# Patient Record
Sex: FEMALE | Race: OTHER | NOT HISPANIC OR LATINO | URBAN - METROPOLITAN AREA
[De-identification: names, ages, dates, MRNs, and addresses within clinical notes are randomized per-mention and may not be internally consistent; named-entity substitution may affect disease eponyms.]

---

## 2023-01-17 ENCOUNTER — EMERGENCY (EMERGENCY)
Facility: HOSPITAL | Age: 38
LOS: 1 days | Discharge: HOME | End: 2023-01-17
Attending: STUDENT IN AN ORGANIZED HEALTH CARE EDUCATION/TRAINING PROGRAM
Payer: COMMERCIAL

## 2023-01-17 VITALS
RESPIRATION RATE: 18 BRPM | WEIGHT: 171.96 LBS | SYSTOLIC BLOOD PRESSURE: 126 MMHG | HEART RATE: 96 BPM | HEIGHT: 69 IN | OXYGEN SATURATION: 100 % | DIASTOLIC BLOOD PRESSURE: 62 MMHG | TEMPERATURE: 99 F

## 2023-01-17 VITALS
TEMPERATURE: 98 F | RESPIRATION RATE: 18 BRPM | HEART RATE: 82 BPM | DIASTOLIC BLOOD PRESSURE: 69 MMHG | SYSTOLIC BLOOD PRESSURE: 110 MMHG | OXYGEN SATURATION: 97 %

## 2023-01-17 PROCEDURE — 99282 EMERGENCY DEPT VISIT SF MDM: CPT

## 2023-01-17 PROCEDURE — 99284 EMERGENCY DEPT VISIT MOD MDM: CPT

## 2023-01-17 RX ORDER — ACETAMINOPHEN 500 MG
975 TABLET ORAL ONCE
Refills: 0 | Status: COMPLETED | OUTPATIENT
Start: 2023-01-17 | End: 2023-01-17

## 2023-01-17 RX ORDER — LIDOCAINE 4 G/100G
1 CREAM TOPICAL ONCE
Refills: 0 | Status: COMPLETED | OUTPATIENT
Start: 2023-01-17 | End: 2023-01-17

## 2023-01-17 RX ADMIN — LIDOCAINE 1 PATCH: 4 CREAM TOPICAL at 12:02

## 2023-01-17 RX ADMIN — Medication 975 MILLIGRAM(S): at 12:02

## 2023-01-17 RX ADMIN — Medication 975 MILLIGRAM(S): at 12:08

## 2023-01-17 NOTE — ED PROVIDER NOTE - PHYSICAL EXAMINATION
CONSTITUTIONAL: non-toxic, well appearing  SKIN: no rash, no petechiae.  EYES: pink conjunctiva, anicteric  NECK: Supple; no meningismus, no JVD  CARD: RRR, no murmurs, equal radial pulses bilaterally 2+  RESP: CTAB, no respiratory distress  ABD: Soft, non-tender, non-distended, no peritoneal signs, no CVA tenderness  SPINE: no midline bony tenderness, right lumbar paravertebral muscle tenderness  EXT: Normal ROM x4. No edema.   NEURO: Alert, oriented. Neuro exam nonfocal, equal strength bilaterally  PSYCH: Cooperative, appropriate.

## 2023-01-17 NOTE — ED ADULT NURSE NOTE - OBJECTIVE STATEMENT
Pt presents to the ED with c/o right lower back pain x 2 days after mopping. Pt is 11 weeks pregnant. Denies fever, abdominal pain, or vaginal bleeding.

## 2023-01-17 NOTE — ED PROVIDER NOTE - PATIENT PORTAL LINK FT
You can access the FollowMyHealth Patient Portal offered by [facility] by registering at the following website: [email or web address]. By joining [employer]’s FollowMyHealth portal, you will also be able to view your health information using other applications (apps) compatible with our system.

## 2023-01-17 NOTE — ED PROVIDER NOTE - CLINICAL SUMMARY MEDICAL DECISION MAKING FREE TEXT BOX
Andrews: 37-year-old female approximately 11 weeks pregnant presents with right lower back pain x2 days.  Patient states she was mopping 2 days ago, bent forward, and felt immediate nonradiating pain in her right lower back.  Denies any fevers, chest pain, shortness of breath, dysuria, urinary urgency, urinary frequency, vomiting, abdominal pain, diarrhea, bloody stools, black tarry stools, vaginal bleeding, loss of fluid per vagina, bowel/bladder dysfunction, saddle anesthesia, numbness, weakness, or rash.  Patient was taking Tylenol with little improvement.  Denies any direct injury or trauma.  Patient states she has follow-up with her OB/GYN and had normal ultrasounds confirming intrauterine pregnancy.  Patient states pregnancy has been uncomplicated to date.  Symptoms MSK in nature, likely muscle spasm. No risk factors or findings concerning for epidural abscess, diskitis, vertebral osteo, cord compression, cauda equina, vertebral fracture or yuli malignancy, AAA, or pyelonephritis.  Patient instructed to consider further imaging and workup through their primary care physician as an outpatient, if symptoms persist. Will provide supportive treatment, DC with OBGYN follow up with return precautions.

## 2023-01-17 NOTE — ED PROVIDER NOTE - OBJECTIVE STATEMENT
37-year-old female approximately 11 weeks pregnant presents with right lower back pain x2 days.  Patient states she was mopping 2 days ago, bent forward, and felt immediate nonradiating pain in her right lower back.  Denies any fevers, chest pain, shortness of breath, dysuria, urinary urgency, urinary frequency, vomiting, abdominal pain, diarrhea, bloody stools, black tarry stools, vaginal bleeding, loss of fluid per vagina, bowel/bladder dysfunction, saddle anesthesia, numbness, weakness, or rash.  Patient was taking Tylenol with little improvement.  Denies any direct injury or trauma.  Patient states she has follow-up with her OB/GYN and had normal ultrasounds confirming intrauterine pregnancy.  Patient states pregnancy has been uncomplicated to date.  Denies any additional complaints.

## 2023-01-20 ENCOUNTER — APPOINTMENT (OUTPATIENT)
Dept: ANTEPARTUM | Facility: CLINIC | Age: 38
End: 2023-01-20
Payer: COMMERCIAL

## 2023-01-20 ENCOUNTER — ASOB RESULT (OUTPATIENT)
Age: 38
End: 2023-01-20

## 2023-01-20 PROCEDURE — 76813 OB US NUCHAL MEAS 1 GEST: CPT

## 2023-01-20 PROCEDURE — 76801 OB US < 14 WKS SINGLE FETUS: CPT

## 2023-01-20 PROCEDURE — 36416 COLLJ CAPILLARY BLOOD SPEC: CPT

## 2023-01-24 PROBLEM — Z00.00 ENCOUNTER FOR PREVENTIVE HEALTH EXAMINATION: Noted: 2023-01-24

## 2023-01-25 ENCOUNTER — ASOB RESULT (OUTPATIENT)
Age: 38
End: 2023-01-25

## 2023-01-25 ENCOUNTER — APPOINTMENT (OUTPATIENT)
Dept: MATERNAL FETAL MEDICINE | Facility: CLINIC | Age: 38
End: 2023-01-25
Payer: COMMERCIAL

## 2023-01-25 ENCOUNTER — APPOINTMENT (OUTPATIENT)
Dept: ANTEPARTUM | Facility: CLINIC | Age: 38
End: 2023-01-25
Payer: COMMERCIAL

## 2023-01-25 PROBLEM — Z00.00 ENCOUNTER FOR PREVENTIVE HEALTH EXAMINATION: Status: ACTIVE | Noted: 2023-01-25

## 2023-01-25 PROCEDURE — 99204 OFFICE O/P NEW MOD 45 MIN: CPT | Mod: 25

## 2023-01-25 PROCEDURE — 76801 OB US < 14 WKS SINGLE FETUS: CPT

## 2023-01-25 PROCEDURE — 76813 OB US NUCHAL MEAS 1 GEST: CPT | Mod: 59

## 2023-01-25 PROCEDURE — 36415 COLL VENOUS BLD VENIPUNCTURE: CPT

## 2023-02-01 ENCOUNTER — APPOINTMENT (OUTPATIENT)
Dept: MATERNAL FETAL MEDICINE | Facility: CLINIC | Age: 38
End: 2023-02-01
Payer: COMMERCIAL

## 2023-02-01 ENCOUNTER — APPOINTMENT (OUTPATIENT)
Dept: MATERNAL FETAL MEDICINE | Facility: CLINIC | Age: 38
End: 2023-02-01

## 2023-02-01 PROCEDURE — 99214 OFFICE O/P EST MOD 30 MIN: CPT

## 2023-02-27 ENCOUNTER — NON-APPOINTMENT (OUTPATIENT)
Age: 38
End: 2023-02-27

## 2023-03-17 ENCOUNTER — APPOINTMENT (OUTPATIENT)
Dept: ANTEPARTUM | Facility: CLINIC | Age: 38
End: 2023-03-17

## 2023-03-20 ENCOUNTER — ASOB RESULT (OUTPATIENT)
Age: 38
End: 2023-03-20

## 2023-03-20 ENCOUNTER — NON-APPOINTMENT (OUTPATIENT)
Age: 38
End: 2023-03-20

## 2023-03-20 ENCOUNTER — APPOINTMENT (OUTPATIENT)
Dept: ANTEPARTUM | Facility: CLINIC | Age: 38
End: 2023-03-20
Payer: COMMERCIAL

## 2023-03-20 PROCEDURE — 76811 OB US DETAILED SNGL FETUS: CPT

## 2023-03-28 ENCOUNTER — ASOB RESULT (OUTPATIENT)
Age: 38
End: 2023-03-28

## 2023-03-28 ENCOUNTER — APPOINTMENT (OUTPATIENT)
Dept: MATERNAL FETAL MEDICINE | Facility: CLINIC | Age: 38
End: 2023-03-28
Payer: COMMERCIAL

## 2023-03-28 ENCOUNTER — NON-APPOINTMENT (OUTPATIENT)
Age: 38
End: 2023-03-28

## 2023-03-28 PROCEDURE — 99204 OFFICE O/P NEW MOD 45 MIN: CPT | Mod: 95

## 2023-03-29 ENCOUNTER — NON-APPOINTMENT (OUTPATIENT)
Age: 38
End: 2023-03-29

## 2023-04-03 ENCOUNTER — APPOINTMENT (OUTPATIENT)
Dept: PEDIATRIC CARDIOLOGY | Facility: CLINIC | Age: 38
End: 2023-04-03
Payer: COMMERCIAL

## 2023-04-03 PROCEDURE — 76825 ECHO EXAM OF FETAL HEART: CPT

## 2023-04-03 PROCEDURE — 93325 DOPPLER ECHO COLOR FLOW MAPG: CPT

## 2023-04-03 PROCEDURE — 76827 ECHO EXAM OF FETAL HEART: CPT

## 2023-04-03 PROCEDURE — 99203 OFFICE O/P NEW LOW 30 MIN: CPT | Mod: 25

## 2023-04-10 ENCOUNTER — APPOINTMENT (OUTPATIENT)
Dept: ANTEPARTUM | Facility: CLINIC | Age: 38
End: 2023-04-10
Payer: COMMERCIAL

## 2023-04-10 ENCOUNTER — ASOB RESULT (OUTPATIENT)
Age: 38
End: 2023-04-10

## 2023-04-10 PROCEDURE — 76828 ECHO EXAM OF FETAL HEART: CPT

## 2023-04-13 ENCOUNTER — APPOINTMENT (OUTPATIENT)
Dept: RHEUMATOLOGY | Facility: CLINIC | Age: 38
End: 2023-04-13
Payer: COMMERCIAL

## 2023-04-13 VITALS
WEIGHT: 180 LBS | RESPIRATION RATE: 16 BRPM | SYSTOLIC BLOOD PRESSURE: 118 MMHG | BODY MASS INDEX: 26.66 KG/M2 | HEART RATE: 86 BPM | OXYGEN SATURATION: 97 % | DIASTOLIC BLOOD PRESSURE: 72 MMHG | HEIGHT: 69 IN | TEMPERATURE: 97.6 F

## 2023-04-13 DIAGNOSIS — K11.7 DISTURBANCES OF SALIVARY SECRETION: ICD-10-CM

## 2023-04-13 DIAGNOSIS — H04.129 DRY EYE SYNDROME OF UNSPECIFIED LACRIMAL GLAND: ICD-10-CM

## 2023-04-13 LAB
APTT BLD: 29.5 SEC
BASOPHILS # BLD AUTO: 0.02 K/UL
BASOPHILS NFR BLD AUTO: 0.2 %
EOSINOPHIL # BLD AUTO: 0.09 K/UL
EOSINOPHIL NFR BLD AUTO: 0.9 %
HCT VFR BLD CALC: 35.5 %
HGB BLD-MCNC: 10.9 G/DL
IMM GRANULOCYTES NFR BLD AUTO: 0.5 %
INR PPP: 0.99 RATIO
LYMPHOCYTES # BLD AUTO: 1.66 K/UL
LYMPHOCYTES NFR BLD AUTO: 17 %
MAN DIFF?: NORMAL
MCHC RBC-ENTMCNC: 26.3 PG
MCHC RBC-ENTMCNC: 30.7 GM/DL
MCV RBC AUTO: 85.5 FL
MONOCYTES # BLD AUTO: 0.53 K/UL
MONOCYTES NFR BLD AUTO: 5.4 %
NEUTROPHILS # BLD AUTO: 7.42 K/UL
NEUTROPHILS NFR BLD AUTO: 76 %
PLATELET # BLD AUTO: 241 K/UL
PT BLD: 11.6 SEC
RBC # BLD: 4.15 M/UL
RBC # FLD: 14.6 %
WBC # FLD AUTO: 9.77 K/UL

## 2023-04-13 PROCEDURE — 99205 OFFICE O/P NEW HI 60 MIN: CPT

## 2023-04-13 RX ORDER — HYDROXYCHLOROQUINE SULFATE 200 MG/1
200 TABLET, FILM COATED ORAL DAILY
Qty: 30 | Refills: 5 | Status: ACTIVE | COMMUNITY
Start: 2023-04-13 | End: 1900-01-01

## 2023-04-13 NOTE — HISTORY OF PRESENT ILLNESS
[FreeTextEntry1] : 1. RA: Missing information but according to patient, she was diagnosed with RA in 2020 with symptoms of swelling and stiffness of her legs and hand joints with positive blood tests. Followed with Dr. Alexus Martines (Rheumatology [facility]) who recommended treatment at that time, but she did not remember the name of the medication and was only taking it for a brief period of time. Symptoms typically worsened in the winter. A CCP Ab from 2022 was negative. She has been off medications since 2022. At the time of her initial visit while pregnant, she had no joint symptoms.\par 2. Positive SSA: Anti-SSA ab positive with titer ~1000, checked in the context of RA diagnosis and 2023 pregnancy. Reported sicca symptoms but no other complaints. She followed with MFM and has had fetal echo's, which were normal. She is currently scheduled to repeat fetal echo's every 2 weeks. MADI, complements were previously negative at an outside lab. Patient also had low titer B2GPI and aCL <40 without previous obstetric complications, DVT/PE history.\par 3. OB history: \par   a. Pregnancy No. 1: 2008, which was a normal, full term pregnancy. \par   b. Pregnancy No. 2: 2023: currently 23 weeks pregnant and no complications. She was started on ASA 81 mg       2xd for aPL low titer blood tests. Fetal echo's every 2 weeks due to SSA+ blood test. \par \par Meds\par ASA 81 mg/d\par vitamins\par

## 2023-04-13 NOTE — ASSESSMENT
[FreeTextEntry1] : 1. RA: Missing information but according to patient, she was diagnosed with RA in 2020 with symptoms of swelling and stiffness of her legs and hand joints with positive blood tests. Followed with Dr. Alexus Martines (Rheumatology [facility]) who recommended treatment at that time, but she did not remember the name of the medication and was only taking it for a brief period of time. Symptoms typically worsened in the winter. A CCP Ab from 2022 was negative. She has been off medications since 2022. At the time of her initial visit while pregnant, she had no joint symptoms.\par 2. Positive SSA: Anti-SSA ab positive with titer ~1000, checked in the context of RA diagnosis and 2023 pregnancy. Reported sicca symptoms but no other complaints. She followed with MFM and has had fetal echo's, which were normal. She is currently scheduled to repeat fetal echo's every 2 weeks. MADI, complements were previously negative at an outside lab. Patient also had low titer B2GPI and aCL <40 without previous obstetric complications, DVT/PE history.\par 3. OB history: \par   a. Pregnancy No. 1: 2008, which was a normal, full term pregnancy. \par   b. Pregnancy No. 2: 2023: currently 23 weeks pregnant and no complications. She was started on ASA 81 mg       2xd for aPL low titer blood tests. Fetal echo's every 2 weeks due to SSA+ blood test. \par \par Plan\par Lab tests\par Reviewed internal and/or external records of other providers\par Contact me\par Start hydroxychloroquine 400 mg/day (toxicities discussed)\par Fetal echo's to continue\par ASA 81 mg to continue\par Return one month\par \par

## 2023-04-13 NOTE — REASON FOR VISIT
[Consultation] : a consultation visit [FreeTextEntry1] : Jenniffer Love MD [id number] [phone]

## 2023-04-13 NOTE — PHYSICAL EXAM
[General Appearance - Alert] : alert [General Appearance - In No Acute Distress] : in no acute distress [General Appearance - Well Nourished] : well nourished [General Appearance - Well Developed] : well developed [Sclera] : the sclera and conjunctiva were normal [PERRL With Normal Accommodation] : pupils were equal in size, round, and reactive to light [Neck Appearance] : the appearance of the neck was normal [Respiration, Rhythm And Depth] : normal respiratory rhythm and effort [Auscultation Breath Sounds / Voice Sounds] : lungs were clear to auscultation bilaterally [Heart Rate And Rhythm] : heart rate was normal and rhythm regular [Heart Sounds] : normal S1 and S2 [Edema] : there was no peripheral edema [Abdomen Soft] : soft [Abdomen Tenderness] : non-tender [Cervical Lymph Nodes Enlarged Posterior Bilaterally] : posterior cervical [Cervical Lymph Nodes Enlarged Anterior Bilaterally] : anterior cervical [Supraclavicular Lymph Nodes Enlarged Bilaterally] : supraclavicular [Abnormal Walk] : normal gait [Nail Clubbing] : no clubbing  or cyanosis of the fingernails [Musculoskeletal - Swelling] : no joint swelling seen [] : no rash [Skin Lesions] : no skin lesions [No Focal Deficits] : no focal deficits

## 2023-04-15 LAB
ALBUMIN SERPL ELPH-MCNC: 3.7 G/DL
ALP BLD-CCNC: 64 U/L
ALT SERPL-CCNC: 8 U/L
ANION GAP SERPL CALC-SCNC: 13 MMOL/L
APPEARANCE: ABNORMAL
AST SERPL-CCNC: 11 U/L
B2 GLYCOPROT1 IGA SERPL IA-ACNC: <5 SAU
B2 GLYCOPROT1 IGG SER-ACNC: <5 SGU
B2 GLYCOPROT1 IGM SER-ACNC: 8.5 SMU
BACTERIA: NEGATIVE
BILIRUB SERPL-MCNC: 0.2 MG/DL
BILIRUBIN URINE: NEGATIVE
BLOOD URINE: NEGATIVE
BUN SERPL-MCNC: 6 MG/DL
C3 SERPL-MCNC: 129 MG/DL
C4 SERPL-MCNC: 42 MG/DL
CALCIUM SERPL-MCNC: 8.7 MG/DL
CARDIOLIPIN IGM SER-MCNC: 16.3 MPL
CARDIOLIPIN IGM SER-MCNC: <5 GPL
CCP AB SER IA-ACNC: <8 UNITS
CHLORIDE SERPL-SCNC: 103 MMOL/L
CK SERPL-CCNC: 42 U/L
CO2 SERPL-SCNC: 22 MMOL/L
COLOR: YELLOW
CONFIRM: 27.5 SEC
CREAT SERPL-MCNC: 0.55 MG/DL
CREAT SPEC-SCNC: 146 MG/DL
CREAT/PROT UR: 0.1 RATIO
CRP SERPL-MCNC: 5 MG/L
DEPRECATED CARDIOLIPIN IGA SER: <5 APL
DIRECT COOMBS: NORMAL
DRVVT IMM 1:2 NP PPP: NORMAL
DRVVT SCREEN TO CONFIRM RATIO: 1.11 RATIO
DSDNA AB SER-ACNC: <12 IU/ML
EGFR: 120 ML/MIN/1.73M2
GLUCOSE QUALITATIVE U: NEGATIVE
HYALINE CASTS: 0 /LPF
KETONES URINE: NEGATIVE
LEUKOCYTE ESTERASE URINE: NEGATIVE
MICROSCOPIC-UA: NORMAL
NITRITE URINE: NEGATIVE
PH URINE: 7
POTASSIUM SERPL-SCNC: 3.8 MMOL/L
PROT SERPL-MCNC: 6.9 G/DL
PROT UR-MCNC: 13 MG/DL
PROTEIN URINE: NORMAL
RED BLOOD CELLS URINE: 0 /HPF
RF+CCP IGG SER-IMP: NEGATIVE
RHEUMATOID FACT SER QL: 15 IU/ML
SCREEN DRVVT: 36.3 SEC
SILICA CLOTTING TIME INTERPRETATION: NORMAL
SILICA CLOTTING TIME S/C: 0.84 RATIO
SODIUM SERPL-SCNC: 138 MMOL/L
SPECIFIC GRAVITY URINE: 1.02
SQUAMOUS EPITHELIAL CELLS: >27 /HPF
THYROGLOB AB SERPL-ACNC: <20 IU/ML
THYROPEROXIDASE AB SERPL IA-ACNC: <10 IU/ML
URINE COMMENTS: NORMAL
UROBILINOGEN URINE: NORMAL
WHITE BLOOD CELLS URINE: 4 /HPF

## 2023-04-16 LAB
CHROMATIN AB SERPL-ACNC: <0.2 AL
ENA RNP AB SER IA-ACNC: 0.6 AL
ENA SM AB SER IA-ACNC: <0.2 AL
ENA SS-A AB SER IA-ACNC: >8 AL
ENA SS-B AB SER IA-ACNC: <0.2 AL
PS IGA SER QL: <1
PS IGG SER QL: <9 UNITS
PS IGM SER QL: 14 UNITS

## 2023-04-17 ENCOUNTER — APPOINTMENT (OUTPATIENT)
Dept: PEDIATRIC CARDIOLOGY | Facility: CLINIC | Age: 38
End: 2023-04-17
Payer: COMMERCIAL

## 2023-04-17 PROCEDURE — 93325 DOPPLER ECHO COLOR FLOW MAPG: CPT | Mod: 59

## 2023-04-17 PROCEDURE — 76826 ECHO EXAM OF FETAL HEART: CPT

## 2023-04-17 PROCEDURE — 76820 UMBILICAL ARTERY ECHO: CPT

## 2023-04-17 PROCEDURE — 76821 MIDDLE CEREBRAL ARTERY ECHO: CPT

## 2023-04-17 PROCEDURE — 99214 OFFICE O/P EST MOD 30 MIN: CPT | Mod: 25

## 2023-04-17 PROCEDURE — 76828 ECHO EXAM OF FETAL HEART: CPT

## 2023-04-18 LAB
ANA PAT FLD IF-IMP: ABNORMAL
ANA SER IF-ACNC: ABNORMAL

## 2023-04-21 LAB
PS-PROTHROM CMPLX IGG SERPL IA-ACNC: <9.4 U
PS-PROTHROM CMPLX IGM SERPL IA-ACNC: 16.3 U

## 2023-04-24 ENCOUNTER — ASOB RESULT (OUTPATIENT)
Age: 38
End: 2023-04-24

## 2023-04-24 ENCOUNTER — APPOINTMENT (OUTPATIENT)
Dept: ANTEPARTUM | Facility: CLINIC | Age: 38
End: 2023-04-24
Payer: COMMERCIAL

## 2023-04-24 ENCOUNTER — TRANSCRIPTION ENCOUNTER (OUTPATIENT)
Age: 38
End: 2023-04-24

## 2023-04-24 PROCEDURE — 76828 ECHO EXAM OF FETAL HEART: CPT | Mod: 59

## 2023-04-24 PROCEDURE — 76816 OB US FOLLOW-UP PER FETUS: CPT

## 2023-05-01 ENCOUNTER — APPOINTMENT (OUTPATIENT)
Dept: PEDIATRIC CARDIOLOGY | Facility: CLINIC | Age: 38
End: 2023-05-01
Payer: COMMERCIAL

## 2023-05-01 PROCEDURE — 76826 ECHO EXAM OF FETAL HEART: CPT

## 2023-05-01 PROCEDURE — 99212 OFFICE O/P EST SF 10 MIN: CPT | Mod: 25

## 2023-05-01 PROCEDURE — 76828 ECHO EXAM OF FETAL HEART: CPT

## 2023-05-01 PROCEDURE — 93325 DOPPLER ECHO COLOR FLOW MAPG: CPT

## 2023-05-08 ENCOUNTER — ASOB RESULT (OUTPATIENT)
Age: 38
End: 2023-05-08

## 2023-05-08 ENCOUNTER — APPOINTMENT (OUTPATIENT)
Dept: ANTEPARTUM | Facility: CLINIC | Age: 38
End: 2023-05-08
Payer: COMMERCIAL

## 2023-05-08 PROCEDURE — 76828 ECHO EXAM OF FETAL HEART: CPT

## 2023-05-09 ENCOUNTER — APPOINTMENT (OUTPATIENT)
Dept: RHEUMATOLOGY | Facility: CLINIC | Age: 38
End: 2023-05-09
Payer: COMMERCIAL

## 2023-05-09 VITALS
WEIGHT: 181 LBS | OXYGEN SATURATION: 98 % | DIASTOLIC BLOOD PRESSURE: 71 MMHG | BODY MASS INDEX: 26.81 KG/M2 | HEIGHT: 69 IN | RESPIRATION RATE: 16 BRPM | SYSTOLIC BLOOD PRESSURE: 120 MMHG | TEMPERATURE: 97.1 F | HEART RATE: 92 BPM

## 2023-05-09 PROCEDURE — 99213 OFFICE O/P EST LOW 20 MIN: CPT

## 2023-05-09 NOTE — HISTORY OF PRESENT ILLNESS
[FreeTextEntry1] : 1. RA: Missing information but according to patient, she was diagnosed with RA in 2020 with symptoms of swelling and stiffness of her legs and hand joints with positive blood tests. Followed with Dr. Alexus Martines (Rheumatology [facility]) who recommended treatment at that time, but she did not remember the name of the medication and was only taking it for a brief period of time. Symptoms typically worsened in the winter. A CCP Ab from 2022 was negative. She has been off medications since 2022. At the time of her initial visit while pregnant, she had no joint symptoms.\par 2. Positive SSA: Anti-SSA ab positive with titer ~1000, checked in the context of RA diagnosis and 2023 pregnancy. Reported sicca symptoms but no other complaints. She followed with MFM and has had fetal echo's, which were normal. She is currently scheduled to repeat fetal echo's every 2 weeks. MADI, complements were previously negative at an outside lab. Patient also had low titer B2GPI and aCL <40 without previous obstetric complications, DVT/PE history.\par 3. OB history: \par   a. Pregnancy No. 1: 2008, which was a normal, full term pregnancy. \par   b. Pregnancy No. 2: 2023: currently 26 weeks pregnant and no complications. She was started on ASA 81 mg       2xd for aPL low titer blood tests. Fetal echo's every 2 weeks due to SSA+ blood test. She started hydroxychloroquine at 22 weeks gestation. \par \par Meds\par ASA 81 mg/d\par vitamins\par hydroxychloroquine  200 mg 2xd

## 2023-05-09 NOTE — REASON FOR VISIT
[Consultation] : a consultation visit [FreeTextEntry1] : Jenniffer Love MD [id number] [phone] [Follow-Up: _____] : a [unfilled] follow-up visit [Spouse] : spouse [Family Member] : family member

## 2023-05-09 NOTE — PHYSICAL EXAM
[General Appearance - Alert] : alert [General Appearance - In No Acute Distress] : in no acute distress [General Appearance - Well Nourished] : well nourished [General Appearance - Well Developed] : well developed [Sclera] : the sclera and conjunctiva were normal [PERRL With Normal Accommodation] : pupils were equal in size, round, and reactive to light [Neck Appearance] : the appearance of the neck was normal [Respiration, Rhythm And Depth] : normal respiratory rhythm and effort [Auscultation Breath Sounds / Voice Sounds] : lungs were clear to auscultation bilaterally [Heart Rate And Rhythm] : heart rate was normal and rhythm regular [Heart Sounds] : normal S1 and S2 [Edema] : there was no peripheral edema [Abdomen Soft] : soft [Abdomen Tenderness] : non-tender [Cervical Lymph Nodes Enlarged Posterior Bilaterally] : posterior cervical [Cervical Lymph Nodes Enlarged Anterior Bilaterally] : anterior cervical [Supraclavicular Lymph Nodes Enlarged Bilaterally] : supraclavicular [Abnormal Walk] : normal gait [Nail Clubbing] : no clubbing  or cyanosis of the fingernails [Musculoskeletal - Swelling] : no joint swelling seen [] : no rash [Skin Lesions] : no skin lesions [No Focal Deficits] : no focal deficits [FreeTextEntry1] : gravid

## 2023-05-09 NOTE — ASSESSMENT
[FreeTextEntry1] : 1. RA: Missing information but according to patient, she was diagnosed with RA in 2020 with symptoms of swelling and stiffness of her legs and hand joints with positive blood tests. Followed with Dr. Alexus Martines (Rheumatology [facility]) who recommended treatment at that time, but she did not remember the name of the medication and was only taking it for a brief period of time. Symptoms typically worsened in the winter. A CCP Ab from 2022 was negative. She has been off medications since 2022. At the time of her initial visit while pregnant, she had no joint symptoms.\par 2. Positive SSA: Anti-SSA ab positive with titer ~1000, checked in the context of RA diagnosis and 2023 pregnancy. Reported sicca symptoms but no other complaints. She followed with MFM and has had fetal echo's, which were normal. She is currently scheduled to repeat fetal echo's every 2 weeks. MADI, complements were previously negative at an outside lab. Patient also had low titer B2GPI and aCL <40 without previous obstetric complications, DVT/PE history.\par 3. OB history: \par   a. Pregnancy No. 1: 2008, which was a normal, full term pregnancy. \par   b. Pregnancy No. 2: 2023: currently 26 weeks pregnant and no complications. She was started on ASA 81 mg       2xd for aPL low titer blood tests. Fetal echo's every 2 weeks due to SSA+ blood test. She started hydroxychloroquine at 22 weeks gestation. \par \par Plan\par Lab tests reviewed\par No new labs\par Return 1 month\par Start hydroxychloroquine 400 mg/day (toxicities discussed)\par Fetal echo's to continue\par ASA 81 mg to continue\par Return one month\par \par

## 2023-05-15 ENCOUNTER — APPOINTMENT (OUTPATIENT)
Dept: PEDIATRIC CARDIOLOGY | Facility: CLINIC | Age: 38
End: 2023-05-15
Payer: COMMERCIAL

## 2023-05-15 PROCEDURE — 99212 OFFICE O/P EST SF 10 MIN: CPT | Mod: 25

## 2023-05-15 PROCEDURE — 93325 DOPPLER ECHO COLOR FLOW MAPG: CPT

## 2023-05-15 PROCEDURE — 76826 ECHO EXAM OF FETAL HEART: CPT

## 2023-05-15 PROCEDURE — 76828 ECHO EXAM OF FETAL HEART: CPT

## 2023-05-22 ENCOUNTER — APPOINTMENT (OUTPATIENT)
Dept: ANTEPARTUM | Facility: CLINIC | Age: 38
End: 2023-05-22
Payer: COMMERCIAL

## 2023-05-22 ENCOUNTER — ASOB RESULT (OUTPATIENT)
Age: 38
End: 2023-05-22

## 2023-05-22 PROCEDURE — 76819 FETAL BIOPHYS PROFIL W/O NST: CPT | Mod: 59

## 2023-05-22 PROCEDURE — 76816 OB US FOLLOW-UP PER FETUS: CPT

## 2023-06-02 ENCOUNTER — ASOB RESULT (OUTPATIENT)
Age: 38
End: 2023-06-02

## 2023-06-02 ENCOUNTER — APPOINTMENT (OUTPATIENT)
Dept: MATERNAL FETAL MEDICINE | Facility: CLINIC | Age: 38
End: 2023-06-02
Payer: COMMERCIAL

## 2023-06-02 PROCEDURE — 99213 OFFICE O/P EST LOW 20 MIN: CPT | Mod: 95

## 2023-06-19 ENCOUNTER — APPOINTMENT (OUTPATIENT)
Dept: ANTEPARTUM | Facility: CLINIC | Age: 38
End: 2023-06-19
Payer: COMMERCIAL

## 2023-06-19 ENCOUNTER — ASOB RESULT (OUTPATIENT)
Age: 38
End: 2023-06-19

## 2023-06-19 PROCEDURE — 76816 OB US FOLLOW-UP PER FETUS: CPT

## 2023-06-19 PROCEDURE — 76819 FETAL BIOPHYS PROFIL W/O NST: CPT

## 2023-06-26 ENCOUNTER — APPOINTMENT (OUTPATIENT)
Dept: RHEUMATOLOGY | Facility: CLINIC | Age: 38
End: 2023-06-26
Payer: COMMERCIAL

## 2023-06-26 VITALS
DIASTOLIC BLOOD PRESSURE: 71 MMHG | OXYGEN SATURATION: 98 % | RESPIRATION RATE: 16 BRPM | WEIGHT: 182 LBS | HEART RATE: 96 BPM | SYSTOLIC BLOOD PRESSURE: 117 MMHG | HEIGHT: 69 IN | TEMPERATURE: 98.1 F | BODY MASS INDEX: 26.96 KG/M2

## 2023-06-26 DIAGNOSIS — O09.90 SUPERVISION OF HIGH RISK PREGNANCY, UNSPECIFIED, UNSPECIFIED TRIMESTER: ICD-10-CM

## 2023-06-26 PROCEDURE — 99214 OFFICE O/P EST MOD 30 MIN: CPT

## 2023-06-27 LAB
ALBUMIN SERPL ELPH-MCNC: 3.5 G/DL
ALP BLD-CCNC: 85 U/L
ALT SERPL-CCNC: 9 U/L
ANION GAP SERPL CALC-SCNC: 13 MMOL/L
APPEARANCE: ABNORMAL
AST SERPL-CCNC: 13 U/L
BACTERIA: ABNORMAL /HPF
BILIRUB SERPL-MCNC: 0.2 MG/DL
BILIRUBIN URINE: NEGATIVE
BLOOD URINE: NEGATIVE
BUN SERPL-MCNC: 5 MG/DL
C3 SERPL-MCNC: 142 MG/DL
C4 SERPL-MCNC: 39 MG/DL
CALCIUM SERPL-MCNC: 8.8 MG/DL
CAST: 0 /LPF
CHLORIDE SERPL-SCNC: 106 MMOL/L
CK SERPL-CCNC: 51 U/L
CO2 SERPL-SCNC: 20 MMOL/L
COLOR: YELLOW
CREAT SERPL-MCNC: 0.63 MG/DL
CREAT SPEC-SCNC: 146 MG/DL
CREAT/PROT UR: 0.1 RATIO
DSDNA AB SER-ACNC: <12 IU/ML
EGFR: 116 ML/MIN/1.73M2
EPITHELIAL CELLS: 29 /HPF
GLUCOSE QUALITATIVE U: NEGATIVE MG/DL
KETONES URINE: NEGATIVE MG/DL
LEUKOCYTE ESTERASE URINE: ABNORMAL
MICROSCOPIC-UA: NORMAL
NITRITE URINE: NEGATIVE
PH URINE: 7
POTASSIUM SERPL-SCNC: 3.9 MMOL/L
PROT SERPL-MCNC: 6.7 G/DL
PROT UR-MCNC: 16 MG/DL
PROTEIN URINE: 30 MG/DL
RED BLOOD CELLS URINE: 1 /HPF
REVIEW: NORMAL
SODIUM SERPL-SCNC: 138 MMOL/L
SPECIFIC GRAVITY URINE: 1.02
UROBILINOGEN URINE: 0.2 MG/DL
WHITE BLOOD CELLS URINE: 0 /HPF

## 2023-06-27 NOTE — HISTORY OF PRESENT ILLNESS
[FreeTextEntry1] : 1. RA: Missing information but according to patient, she was diagnosed with RA in 2020 with symptoms of swelling and stiffness of her legs and hand joints with positive blood tests. Followed with Dr. Alexus Martines (Rheumatology [facility]) who recommended treatment at that time, but she did not remember the name of the medication and was only taking it for a brief period of time. Symptoms typically worsened in the winter. A CCP Ab from 2022 was negative. She has been off medications since 2022. At the time of her initial visit while pregnant, she had no joint symptoms.\par 2. Positive SSA: Anti-SSA ab positive with titer ~1000, checked in the context of RA diagnosis and 2023 pregnancy. Reported sicca symptoms but no other complaints. She followed with MFM and has had fetal echo's, which were normal. She is currently scheduled to repeat fetal echo's every 2 weeks. MADI, complements were previously negative at an outside lab. Patient also had low titer B2GPI and aCL <40 without previous obstetric complications, DVT/PE history.\par 3. OB history: \par   a. Pregnancy No. 1: 2008, which was a normal, full term pregnancy. \par   b. Pregnancy No. 2: 2023: currently 34 weeks pregnant and no complications. She was started on ASA 81 mg       2xd for aPL low titer blood tests. Fetal echo's every 2 weeks due to SSA+ blood test. She started hydroxychloroquine at 22 weeks gestation. \par \par Meds\par ASA 81 mg/d\par vitamins\par hydroxychloroquine  200 mg 2xd  \par Fe\par Vit D

## 2023-06-27 NOTE — PHYSICAL EXAM
[General Appearance - Alert] : alert [General Appearance - In No Acute Distress] : in no acute distress [General Appearance - Well Nourished] : well nourished [General Appearance - Well Developed] : well developed [Sclera] : the sclera and conjunctiva were normal [PERRL With Normal Accommodation] : pupils were equal in size, round, and reactive to light [Neck Appearance] : the appearance of the neck was normal [Respiration, Rhythm And Depth] : normal respiratory rhythm and effort [Auscultation Breath Sounds / Voice Sounds] : lungs were clear to auscultation bilaterally [Heart Rate And Rhythm] : heart rate was normal and rhythm regular [Heart Sounds] : normal S1 and S2 [Edema] : there was no peripheral edema [Abdomen Soft] : soft [Abdomen Tenderness] : non-tender [FreeTextEntry1] : gravid [Cervical Lymph Nodes Enlarged Posterior Bilaterally] : posterior cervical [Cervical Lymph Nodes Enlarged Anterior Bilaterally] : anterior cervical [Supraclavicular Lymph Nodes Enlarged Bilaterally] : supraclavicular [Abnormal Walk] : normal gait [Nail Clubbing] : no clubbing  or cyanosis of the fingernails [Musculoskeletal - Swelling] : no joint swelling seen [] : no rash [Skin Lesions] : no skin lesions [No Focal Deficits] : no focal deficits

## 2023-06-27 NOTE — ASSESSMENT
[FreeTextEntry1] : 1. RA: Missing information but according to patient, she was diagnosed with RA in 2020 with symptoms of swelling and stiffness of her legs and hand joints with positive blood tests. Followed with Dr. Alexus Martines (Rheumatology [facility]) who recommended treatment at that time, but she did not remember the name of the medication and was only taking it for a brief period of time. Symptoms typically worsened in the winter. A CCP Ab from 2022 was negative. She has been off medications since 2022. At the time of her initial visit while pregnant, she had no joint symptoms.\par 2. Positive SSA: Anti-SSA ab positive with titer ~1000, checked in the context of RA diagnosis and 2023 pregnancy. Reported sicca symptoms but no other complaints. She followed with MFM and has had fetal echo's, which were normal. She is currently scheduled to repeat fetal echo's every 2 weeks. MADI, complements were previously negative at an outside lab. Patient also had low titer B2GPI and aCL <40 without previous obstetric complications, DVT/PE history.\par 3. OB history: \par   a. Pregnancy No. 1: 2008, which was a normal, full term pregnancy. \par   b. Pregnancy No. 2: 2023: currently 34 weeks pregnant and no complications. She was started on ASA 81 mg       2xd for aPL low titer blood tests. Fetal echo's every 2 weeks due to SSA+ blood test. She started hydroxychloroquine at 22 weeks gestation. \par \par Plan\par Lab tests reviewed\par New labs\par Return 1 month\par ASA 81 mg to continue\par Return one month post-partum\par \par

## 2023-07-01 ENCOUNTER — OUTPATIENT (OUTPATIENT)
Dept: OUTPATIENT SERVICES | Facility: HOSPITAL | Age: 38
LOS: 1 days | End: 2023-07-01
Payer: COMMERCIAL

## 2023-07-01 DIAGNOSIS — O26.899 OTHER SPECIFIED PREGNANCY RELATED CONDITIONS, UNSPECIFIED TRIMESTER: ICD-10-CM

## 2023-07-01 DIAGNOSIS — Z3A.00 WEEKS OF GESTATION OF PREGNANCY NOT SPECIFIED: ICD-10-CM

## 2023-07-01 LAB
APPEARANCE UR: CLEAR — SIGNIFICANT CHANGE UP
BILIRUB UR-MCNC: NEGATIVE — SIGNIFICANT CHANGE UP
COLOR SPEC: YELLOW — SIGNIFICANT CHANGE UP
DIFF PNL FLD: NEGATIVE — SIGNIFICANT CHANGE UP
GLUCOSE UR QL: NEGATIVE — SIGNIFICANT CHANGE UP
KETONES UR-MCNC: NEGATIVE — SIGNIFICANT CHANGE UP
LEUKOCYTE ESTERASE UR-ACNC: ABNORMAL
NITRITE UR-MCNC: NEGATIVE — SIGNIFICANT CHANGE UP
PH UR: 7 — SIGNIFICANT CHANGE UP (ref 5–8)
PROT UR-MCNC: NEGATIVE — SIGNIFICANT CHANGE UP
SP GR SPEC: 1.01 — SIGNIFICANT CHANGE UP (ref 1.01–1.02)
UROBILINOGEN FLD QL: NEGATIVE — SIGNIFICANT CHANGE UP

## 2023-07-01 PROCEDURE — 59025 FETAL NON-STRESS TEST: CPT

## 2023-07-01 PROCEDURE — G0463: CPT

## 2023-07-01 PROCEDURE — 81001 URINALYSIS AUTO W/SCOPE: CPT

## 2023-07-01 RX ORDER — SODIUM CHLORIDE 9 MG/ML
1000 INJECTION, SOLUTION INTRAVENOUS ONCE
Refills: 0 | Status: DISCONTINUED | OUTPATIENT
Start: 2023-07-01 | End: 2023-07-15

## 2023-07-10 ENCOUNTER — ASOB RESULT (OUTPATIENT)
Age: 38
End: 2023-07-10

## 2023-07-10 ENCOUNTER — APPOINTMENT (OUTPATIENT)
Dept: ANTEPARTUM | Facility: CLINIC | Age: 38
End: 2023-07-10
Payer: COMMERCIAL

## 2023-07-10 PROCEDURE — 99202 OFFICE O/P NEW SF 15 MIN: CPT | Mod: 25

## 2023-07-10 PROCEDURE — 76819 FETAL BIOPHYS PROFIL W/O NST: CPT

## 2023-07-10 PROCEDURE — 76816 OB US FOLLOW-UP PER FETUS: CPT

## 2023-07-17 ENCOUNTER — APPOINTMENT (OUTPATIENT)
Dept: ANTEPARTUM | Facility: CLINIC | Age: 38
End: 2023-07-17

## 2023-07-24 ENCOUNTER — APPOINTMENT (OUTPATIENT)
Dept: ANTEPARTUM | Facility: CLINIC | Age: 38
End: 2023-07-24

## 2023-07-31 ENCOUNTER — APPOINTMENT (OUTPATIENT)
Dept: ANTEPARTUM | Facility: CLINIC | Age: 38
End: 2023-07-31

## 2023-08-09 ENCOUNTER — TRANSCRIPTION ENCOUNTER (OUTPATIENT)
Age: 38
End: 2023-08-09

## 2023-08-09 ENCOUNTER — INPATIENT (INPATIENT)
Facility: HOSPITAL | Age: 38
LOS: 1 days | Discharge: HOME | End: 2023-08-11
Attending: STUDENT IN AN ORGANIZED HEALTH CARE EDUCATION/TRAINING PROGRAM | Admitting: STUDENT IN AN ORGANIZED HEALTH CARE EDUCATION/TRAINING PROGRAM

## 2023-08-09 VITALS — TEMPERATURE: 98 F | SYSTOLIC BLOOD PRESSURE: 112 MMHG | HEART RATE: 82 BPM | DIASTOLIC BLOOD PRESSURE: 67 MMHG

## 2023-08-09 DIAGNOSIS — Z98.890 OTHER SPECIFIED POSTPROCEDURAL STATES: Chronic | ICD-10-CM

## 2023-08-09 DIAGNOSIS — D89.9 DISORDER INVOLVING THE IMMUNE MECHANISM, UNSPECIFIED: ICD-10-CM

## 2023-08-09 DIAGNOSIS — Z34.90 ENCOUNTER FOR SUPERVISION OF NORMAL PREGNANCY, UNSPECIFIED, UNSPECIFIED TRIMESTER: ICD-10-CM

## 2023-08-09 LAB
BASOPHILS # BLD AUTO: 0.02 K/UL — SIGNIFICANT CHANGE UP (ref 0–0.2)
BASOPHILS NFR BLD AUTO: 0.2 % — SIGNIFICANT CHANGE UP (ref 0–2)
BLD GP AB SCN SERPL QL: NEGATIVE — SIGNIFICANT CHANGE UP
COVID-19 SPIKE DOMAIN AB INTERP: POSITIVE
COVID-19 SPIKE DOMAIN ANTIBODY RESULT: >250 U/ML — HIGH
EOSINOPHIL # BLD AUTO: 0.08 K/UL — SIGNIFICANT CHANGE UP (ref 0–0.5)
EOSINOPHIL NFR BLD AUTO: 0.9 % — SIGNIFICANT CHANGE UP (ref 0–6)
HCT VFR BLD CALC: 32.9 % — LOW (ref 34.5–45)
HGB BLD-MCNC: 10.4 G/DL — LOW (ref 11.5–15.5)
IANC: 5.98 K/UL — SIGNIFICANT CHANGE UP (ref 1.8–7.4)
IMM GRANULOCYTES NFR BLD AUTO: 0.5 % — SIGNIFICANT CHANGE UP (ref 0–0.9)
LYMPHOCYTES # BLD AUTO: 1.84 K/UL — SIGNIFICANT CHANGE UP (ref 1–3.3)
LYMPHOCYTES # BLD AUTO: 21.4 % — SIGNIFICANT CHANGE UP (ref 13–44)
MCHC RBC-ENTMCNC: 25.2 PG — LOW (ref 27–34)
MCHC RBC-ENTMCNC: 31.6 GM/DL — LOW (ref 32–36)
MCV RBC AUTO: 79.7 FL — LOW (ref 80–100)
MONOCYTES # BLD AUTO: 0.65 K/UL — SIGNIFICANT CHANGE UP (ref 0–0.9)
MONOCYTES NFR BLD AUTO: 7.5 % — SIGNIFICANT CHANGE UP (ref 2–14)
NEUTROPHILS # BLD AUTO: 5.98 K/UL — SIGNIFICANT CHANGE UP (ref 1.8–7.4)
NEUTROPHILS NFR BLD AUTO: 69.5 % — SIGNIFICANT CHANGE UP (ref 43–77)
NRBC # BLD: 0 /100 WBCS — SIGNIFICANT CHANGE UP (ref 0–0)
NRBC # FLD: 0 K/UL — SIGNIFICANT CHANGE UP (ref 0–0)
PLATELET # BLD AUTO: 195 K/UL — SIGNIFICANT CHANGE UP (ref 150–400)
RBC # BLD: 4.13 M/UL — SIGNIFICANT CHANGE UP (ref 3.8–5.2)
RBC # FLD: 18.4 % — HIGH (ref 10.3–14.5)
RH IG SCN BLD-IMP: NEGATIVE — SIGNIFICANT CHANGE UP
RH IG SCN BLD-IMP: NEGATIVE — SIGNIFICANT CHANGE UP
SARS-COV-2 IGG+IGM SERPL QL IA: >250 U/ML — HIGH
SARS-COV-2 IGG+IGM SERPL QL IA: POSITIVE
T PALLIDUM AB TITR SER: NEGATIVE — SIGNIFICANT CHANGE UP
WBC # BLD: 8.61 K/UL — SIGNIFICANT CHANGE UP (ref 3.8–10.5)
WBC # FLD AUTO: 8.61 K/UL — SIGNIFICANT CHANGE UP (ref 3.8–10.5)

## 2023-08-09 RX ORDER — BENZOCAINE 10 %
1 GEL (GRAM) MUCOUS MEMBRANE EVERY 6 HOURS
Refills: 0 | Status: DISCONTINUED | OUTPATIENT
Start: 2023-08-09 | End: 2023-08-11

## 2023-08-09 RX ORDER — DIPHENHYDRAMINE HCL 50 MG
25 CAPSULE ORAL EVERY 6 HOURS
Refills: 0 | Status: DISCONTINUED | OUTPATIENT
Start: 2023-08-09 | End: 2023-08-11

## 2023-08-09 RX ORDER — LANOLIN
1 OINTMENT (GRAM) TOPICAL EVERY 6 HOURS
Refills: 0 | Status: DISCONTINUED | OUTPATIENT
Start: 2023-08-09 | End: 2023-08-11

## 2023-08-09 RX ORDER — PRAMOXINE HYDROCHLORIDE 150 MG/15G
1 AEROSOL, FOAM RECTAL EVERY 4 HOURS
Refills: 0 | Status: DISCONTINUED | OUTPATIENT
Start: 2023-08-09 | End: 2023-08-11

## 2023-08-09 RX ORDER — OXYCODONE HYDROCHLORIDE 5 MG/1
5 TABLET ORAL
Refills: 0 | Status: DISCONTINUED | OUTPATIENT
Start: 2023-08-09 | End: 2023-08-11

## 2023-08-09 RX ORDER — TETANUS TOXOID, REDUCED DIPHTHERIA TOXOID AND ACELLULAR PERTUSSIS VACCINE, ADSORBED 5; 2.5; 8; 8; 2.5 [IU]/.5ML; [IU]/.5ML; UG/.5ML; UG/.5ML; UG/.5ML
0.5 SUSPENSION INTRAMUSCULAR ONCE
Refills: 0 | Status: DISCONTINUED | OUTPATIENT
Start: 2023-08-09 | End: 2023-08-11

## 2023-08-09 RX ORDER — FAMOTIDINE 10 MG/ML
20 INJECTION INTRAVENOUS DAILY
Refills: 0 | Status: DISCONTINUED | OUTPATIENT
Start: 2023-08-09 | End: 2023-08-11

## 2023-08-09 RX ORDER — DIBUCAINE 1 %
1 OINTMENT (GRAM) RECTAL EVERY 6 HOURS
Refills: 0 | Status: DISCONTINUED | OUTPATIENT
Start: 2023-08-09 | End: 2023-08-11

## 2023-08-09 RX ORDER — SIMETHICONE 80 MG/1
80 TABLET, CHEWABLE ORAL EVERY 4 HOURS
Refills: 0 | Status: DISCONTINUED | OUTPATIENT
Start: 2023-08-09 | End: 2023-08-11

## 2023-08-09 RX ORDER — SODIUM CHLORIDE 9 MG/ML
1000 INJECTION, SOLUTION INTRAVENOUS
Refills: 0 | Status: DISCONTINUED | OUTPATIENT
Start: 2023-08-09 | End: 2023-08-09

## 2023-08-09 RX ORDER — OXYTOCIN 10 UNIT/ML
VIAL (ML) INJECTION
Qty: 30 | Refills: 0 | Status: DISCONTINUED | OUTPATIENT
Start: 2023-08-09 | End: 2023-08-09

## 2023-08-09 RX ORDER — AMPICILLIN TRIHYDRATE 250 MG
1 CAPSULE ORAL EVERY 4 HOURS
Refills: 0 | Status: DISCONTINUED | OUTPATIENT
Start: 2023-08-09 | End: 2023-08-09

## 2023-08-09 RX ORDER — IBUPROFEN 200 MG
600 TABLET ORAL EVERY 6 HOURS
Refills: 0 | Status: COMPLETED | OUTPATIENT
Start: 2023-08-09 | End: 2024-07-07

## 2023-08-09 RX ORDER — AMPICILLIN TRIHYDRATE 250 MG
2 CAPSULE ORAL ONCE
Refills: 0 | Status: COMPLETED | OUTPATIENT
Start: 2023-08-09 | End: 2023-08-09

## 2023-08-09 RX ORDER — OXYTOCIN 10 UNIT/ML
41.67 VIAL (ML) INJECTION
Qty: 20 | Refills: 0 | Status: DISCONTINUED | OUTPATIENT
Start: 2023-08-09 | End: 2023-08-10

## 2023-08-09 RX ORDER — SODIUM CHLORIDE 9 MG/ML
1000 INJECTION, SOLUTION INTRAVENOUS ONCE
Refills: 0 | Status: COMPLETED | OUTPATIENT
Start: 2023-08-09 | End: 2023-08-09

## 2023-08-09 RX ORDER — ACETAMINOPHEN 500 MG
975 TABLET ORAL
Refills: 0 | Status: DISCONTINUED | OUTPATIENT
Start: 2023-08-09 | End: 2023-08-11

## 2023-08-09 RX ORDER — SODIUM CHLORIDE 9 MG/ML
3 INJECTION INTRAMUSCULAR; INTRAVENOUS; SUBCUTANEOUS EVERY 8 HOURS
Refills: 0 | Status: DISCONTINUED | OUTPATIENT
Start: 2023-08-09 | End: 2023-08-11

## 2023-08-09 RX ORDER — AER TRAVELER 0.5 G/1
1 SOLUTION RECTAL; TOPICAL EVERY 4 HOURS
Refills: 0 | Status: DISCONTINUED | OUTPATIENT
Start: 2023-08-09 | End: 2023-08-11

## 2023-08-09 RX ORDER — CHLORHEXIDINE GLUCONATE 213 G/1000ML
1 SOLUTION TOPICAL DAILY
Refills: 0 | Status: DISCONTINUED | OUTPATIENT
Start: 2023-08-09 | End: 2023-08-09

## 2023-08-09 RX ORDER — OXYCODONE HYDROCHLORIDE 5 MG/1
5 TABLET ORAL ONCE
Refills: 0 | Status: DISCONTINUED | OUTPATIENT
Start: 2023-08-09 | End: 2023-08-11

## 2023-08-09 RX ORDER — FERROUS SULFATE 325(65) MG
1 TABLET ORAL
Refills: 0 | DISCHARGE

## 2023-08-09 RX ORDER — MAGNESIUM HYDROXIDE 400 MG/1
30 TABLET, CHEWABLE ORAL
Refills: 0 | Status: DISCONTINUED | OUTPATIENT
Start: 2023-08-09 | End: 2023-08-11

## 2023-08-09 RX ORDER — HYDROCORTISONE 1 %
1 OINTMENT (GRAM) TOPICAL EVERY 6 HOURS
Refills: 0 | Status: DISCONTINUED | OUTPATIENT
Start: 2023-08-09 | End: 2023-08-11

## 2023-08-09 RX ORDER — OXYTOCIN 10 UNIT/ML
333.33 VIAL (ML) INJECTION
Qty: 20 | Refills: 0 | Status: COMPLETED | OUTPATIENT
Start: 2023-08-09 | End: 2023-08-09

## 2023-08-09 RX ORDER — ACETAMINOPHEN 500 MG
975 TABLET ORAL ONCE
Refills: 0 | Status: COMPLETED | OUTPATIENT
Start: 2023-08-09 | End: 2023-08-09

## 2023-08-09 RX ORDER — KETOROLAC TROMETHAMINE 30 MG/ML
30 SYRINGE (ML) INJECTION ONCE
Refills: 0 | Status: DISCONTINUED | OUTPATIENT
Start: 2023-08-09 | End: 2023-08-09

## 2023-08-09 RX ADMIN — Medication 200 GRAM(S): at 03:05

## 2023-08-09 RX ADMIN — Medication 2 MILLIUNIT(S)/MIN: at 16:40

## 2023-08-09 RX ADMIN — Medication 1000 MILLIUNIT(S)/MIN: at 18:15

## 2023-08-09 RX ADMIN — Medication 108 GRAM(S): at 11:20

## 2023-08-09 RX ADMIN — Medication 108 GRAM(S): at 15:16

## 2023-08-09 RX ADMIN — SODIUM CHLORIDE 1000 MILLILITER(S): 9 INJECTION, SOLUTION INTRAVENOUS at 11:35

## 2023-08-09 RX ADMIN — SODIUM CHLORIDE 125 MILLILITER(S): 9 INJECTION, SOLUTION INTRAVENOUS at 03:04

## 2023-08-09 RX ADMIN — Medication 975 MILLIGRAM(S): at 19:55

## 2023-08-09 RX ADMIN — SODIUM CHLORIDE 125 MILLILITER(S): 9 INJECTION, SOLUTION INTRAVENOUS at 15:46

## 2023-08-09 RX ADMIN — Medication 108 GRAM(S): at 07:27

## 2023-08-09 RX ADMIN — FAMOTIDINE 20 MILLIGRAM(S): 10 INJECTION INTRAVENOUS at 05:10

## 2023-08-09 NOTE — OB PROVIDER LABOR PROGRESS NOTE - ASSESSMENT
CNM OB Progress Note    Patient seen and evaluated at bedside.  Denies complaints.  Comfortable w/ anesthesia epidural.      T(C): 36.7 (08-09-23 @ 11:30), Max: 36.9 (08-09-23 @ 09:30)  HR: 68 (08-09-23 @ 11:29) (65 - 98)  BP: 106/63 (08-09-23 @ 11:25) (93/50 - 122/68)  RR: 18 (08-09-23 @ 11:30) (16 - 18)  SpO2: 95% (08-09-23 @ 11:29) (91% - 98%)    SVE: 3/80/-3      A/P 38y P1 @ 40wks admitted for rrIOL. Abdiaziz blackburn had intermittent cat 2 tracing since 7am with improvement periodically.  Patient has been unable to receive any further IOL agents secondary to tracing. Last BC #2 @ 6am.    Intermittent cat 2 tracing  patient SROM clear fluids at 923am  patient has anesthesia epidural in place    VE now 3cm  repositioned to left lateral position  continue peanutball use  reposition PRN  consider transferring to L&D once a bed is available  -Continue to monitor with EFM & TOCO  -Recheck patient in 2-4 hours or PRN    Consider starting IV Pitocin shall we establish category 1 tracing    Intermittent Category 2 tracing  reposition PRN  1L LR Bolus ordered now  oxygen to be given via non-breather mask    Discussed with MD Dorian Butler  
CNM OB Progress Note    Patient seen and evaluated at bedside.  Denies complaints.  Comfortable w/ anesthesia epidural.      T(C): 36.9 (23 @ 13:30), Max: 36.9 (23 @ 09:30)  HR: 80 (23 @ 15:09) (55 - 109)  BP: 104/63 (23 @ 15:09) (90/50 - 122/68)  RR: 18 (23 @ 13:30) (16 - 18)  SpO2: 91% (23 @ 15:09) (91% - 100%)    SVE: 9.5/90/-1    -Labor: CAT 2 TRACING    -Analgesia: ANESTHESIA EPIDURAL  -Induction with: BC CYTOTEC    ordered for pitocin, but secondary to cat 2, Pitocin was not started  advanced VE, 9.5cm  transferred to LDR 8 now  ISE placed by MD Alarcon once transferred to LDR 8  Terb x1 given now    plan of care discussed with patient, patient questions and concerns addressed by MD Alarcon, patient verbalizes understanding  anticipate       -Continue to monitor with ISE & TOCO  -Recheck patient in 2-4 hours or PRN    Discussed with MD Tatiana Butler

## 2023-08-09 NOTE — DISCHARGE NOTE OB - NS MD DC FALL RISK RISK
For information on Fall & Injury Prevention, visit: [email or web address] OR  [email or web address] OR  https://www.cdc.gov/steadi/patient.html

## 2023-08-09 NOTE — OB PROVIDER H&P - NSLOWPPHRISK_OBGYN_A_OB
No previous uterine incision/[place] Pregnancy/Less than or equal to 4 previous vaginal births/No known bleeding disorder/No history of postpartum hemorrhage/No other PPH risks indicated

## 2023-08-09 NOTE — OB RN DELIVERY SUMMARY - NSSELHIDDEN_OBGYN_ALL_OB_FT
[NS_[email or web address]_ALL_OB_FT:[email or web address]],[NS_DeliveryAssist1_OBGYN_ALL_OB_FT:[id number]=],[NS_DeliveryRN_OBGYN_ALL_OB_FT:[id number]=]

## 2023-08-09 NOTE — OB PROVIDER H&P - NSHPPHYSICALEXAM_GEN_ALL_CORE
Vital Signs Last 24 Hrs  T(C): 36.6 (09 Aug 2023 01:31), Max: 36.6 (09 Aug 2023 01:14)  T(F): 97.9 (09 Aug 2023 01:31), Max: 97.9 (09 Aug 2023 01:31)  HR: 82 (09 Aug 2023 01:31) (82 - 82)  BP: 112/67 (09 Aug 2023 01:31) (112/67 - 112/67)  BP(mean): --  RR: 16 (09 Aug 2023 01:31) (16 - 16)  SpO2: --    Parameters below as of 09 Aug 2023 01:31  Patient On (Oxygen Delivery Method): room air        Physical Exam:  Gen: NAD, AxOx3  CV: RRR  Resp: CTAB  Abd: soft, NT, gravid      SVE: 0/0/-3  FHT: Category 1  [place]: Irregular  EFW: 3745g  Sono: Cephalic

## 2023-08-09 NOTE — OB RN PATIENT PROFILE - PRO RUBELLA INFANT
----- Message from Jose Juan Whitaker MD sent at 10/25/2017  2:04 PM CDT -----  Please call pt... KEPPRA level remains elevated, but if she is doing well and NOT having tolerability issues with it I would leave the dose unchanged and follow up with Dr. Olmedo in 4 months.    She should call sooner if she has problems.   immune

## 2023-08-09 NOTE — OB NEONATOLOGY/PEDIATRICIAN DELIVERY SUMMARY - NSMATERNALFETALCONCERNS_OBGYN_ALL_OB_FT
Fetal Alert  5.2.23: Maternal Hx SSA+ antibodies. Fetal echo done 5/1/23: Fetal heart rhythm during exam: normal fetal rhythm and rate of 138 bpm.After birth, an electrocardiogram (EKG) should be performed on the baby and interpreted by pediatric cardiology prior to hospital discharge.  Jeanie Sharpe RN  6.19.23: ADDENDUM:After birth, an electrocardiogram (EKG) should be performed on the baby and interpreted by pediatric cardiology prior to hospital discharge. If EKG is normal, no further follow-up for the baby is needed. Jeanie Sharpe RN

## 2023-08-09 NOTE — CHART NOTE - NSCHARTNOTEFT_GEN_A_CORE
pt was evaluated at bedside for fetal late deceleration   ve: 10/100/0   will begin pushing   anticipate vaginal delivery

## 2023-08-09 NOTE — OB RN DELIVERY SUMMARY - NS_SEPSISRSKCALC_OBGYN_ALL_OB_FT
EOS calculated successfully. EOS Risk Factor: 0.5/1000 live births ([facility] national incidence); GA=40w1d; Temp=98.42; ROM=8.4; GBS='Positive'; Antibiotics='GBS specific antibiotics > 2 hrs prior to birth'

## 2023-08-09 NOTE — OB PROVIDER H&P - ASSESSMENT
A/P: Pt is a 37yo  @40.1wks, JEREMY: 23, who presents as a scheduled risk reducing induction of labor.  - PNC significant for: +SSA, GBS positive, AMA, anemia with pregnancy     Maternal Hx SSA+ antibodies. Fetal echo done 23: Fetal heart rhythm during exam: normal fetal rhythm and rate of 138 bpm. After birth, an electrocardiogram (EKG) should be performed on the baby and interpreted by pediatric cardiology prior to hospital discharge.  After birth, an electrocardiogram (EKG) should be performed on the baby and interpreted by pediatric cardiology prior to hospital discharge. If EKG is normal, no further follow-up for the baby is needed.       Plan:     1. Admit to LND. Routine Labs. IVF  2. IOL with buccal cytotec and cooks cervical balloon  3. Fetus: Cat 1 tracing, Vertex, EFW 3745g . C/w EFM.  4. GBS positive, ampicillin for prophylaxis  5. Pain: IV pain meds/epidural PRN      CLEMENTINE Schaeffer  Discussed with Dr. Love

## 2023-08-09 NOTE — OB PROVIDER DELIVERY SUMMARY - NSSELHIDDEN_OBGYN_ALL_OB_FT
[NS_DeliveryAttending1_OBGYN_ALL_OB_FT:[email or web address]=],[NS_DeliveryAssist1_OBGYN_ALL_OB_FT:[id number]]

## 2023-08-09 NOTE — OB NEONATOLOGY/PEDIATRICIAN DELIVERY SUMMARY - BABY A: APGAR 1 MIN COLOR, DELIVERY
05-24    143  |  105  |  6<L>  ----------------------------<  83  3.4<L>   |  27  |  0.57    Ca    8.9      24 May 2023 05:25  Phos  3.4     05-24  Mg     1.90     05-24     (1) body pink, extremities blue 05-24 Na 143 mmol/L Glu 83 mg/dL K+ 3.4 mmol/L<L> Cr 0.57 mg/dL BUN 6 mg/dL<L> Phos 3.4 mg/dL

## 2023-08-09 NOTE — OB PROVIDER DELIVERY SUMMARY - NSPROVIDERDELIVERYNOTE_OBGYN_ALL_OB_FT
Patient was fully dilated and pushing. Fetal head was CAROLYN and head delivered without complication. The anterior and posterior shoulders delivered, followed by the remaining body atraumatically. [place] emerged vigorous and crying. Delayed cord clamping was performed, and then clamped and cut. Cord blood gases collected x2. The  was placed skin to skin with mother then evaluated by awaiting pediatric team. The placenta delivered intact with membranes. Pitocin was administered. Uterus massaged, fundus found to be firm. Cervix, vagina and perineum inspected and found to be intact.     Viable male infant delivered, weighing 3740g, with APGARs 9/9    Lacteration: intact  EBL: 25    Dr. Alarcon present for the delivery  Laly CROWE

## 2023-08-09 NOTE — OB NEONATOLOGY/PEDIATRICIAN DELIVERY SUMMARY - NSPEDSNEONOTESA_OBGYN_ALL_OB_FT
Called by OBGYN to attend  due to Cat II tracing. Baby is product of a 40+1 week gestation born to a  38 y.o. F. Maternal labs include Blood Type O- s/p Rhogam at 28 weeks, HIV neg, RPR NR, Hep B neg, GBS + on  s/p amp x3. Maternal history is significant for RA, and SSA+ antibodies. SROM at 0925 on  with clear fluid. Baby emerged crying, with poor tone. Delayed cord clamping x60s performed***. Infant was brought to radiant warmer and warmed, dried, stimulated and suctioned. HR>100, normal respiratory effort. with APGARS of 8/9. Highest maternal temperature was 36.8, EOS score: 0.06. Admit to NBN, baby stable for transfer. Mom plans to initiate breastfeeding, consents Hep B vaccine and consents for circ.    : 23  TOB: 1749    Physical Exam:  Gen: NAD, +grimace  HEENT: anterior fontanel open soft and flat, no cleft lip/palate, ears normal set, no ear pits or tags. no lesions in mouth/throat, nares clinically patent  Resp: no increased work of breathing, good air entry b/l, clear to auscultation bilaterally  Cardio: Normal S1/S2, regular rate and rhythm, no murmurs, rubs or gallops  Abd: soft, non tender, non distended, + bowel sounds, umbilical cord with 3 vessels  Neuro: +grasp/suck/delio, normal tone  Extremities: negative treviño and ortolani, moving all extremities, full range of motion x 4, no crepitus  Skin: pink, warm  Genitals: Normal male anatomy, testicles palpable in scrotum b/l, Viral 1, anus patent

## 2023-08-09 NOTE — OB PROVIDER H&P - HISTORY OF PRESENT ILLNESS
HPI: Pt is a 37yo  @40.1wks, JEREMY: 23,  presenting as a scheduled risk reducing induction of labor. Maternal hx significant for +SSA antibodies. Patient follows up with Dr. Mccord rheumatologist. Patient states she was started on hydroxychloroquine 400mg/day but declines taking it because it makes her feel "sick". Patient states serial fetal echos every 2 weeks that were WNL throughout pregnancy. Patient denies any complaints at this time. Denies LOF/VB/CTX. Endorses positive FM.  -AMA  -GBS positive  -EFW: 3745g      Per RN Jeanie Sharpe note:  PNC complicated by Maternal Hx SSA+ antibodies. Fetal echo done 23: Fetal heart rhythm during exam: normal fetal rhythm and rate of 138 bpm. After birth, an electrocardiogram (EKG) should be performed on the baby and interpreted by pediatric cardiology prior to hospital discharge.  After birth, an electrocardiogram (EKG) should be performed on the baby and interpreted by pediatric cardiology prior to hospital discharge. If EKG is normal, no further follow-up for the baby is needed.       OBHx:   FT 2008  9#, uncomplicated  GynHx: denies hx of fibroids, cysts, abnormal Pap smears or STDs  Cervical polypectomy   PMHx: RA dx - prescribed hydroxychloroquine 400mg/day which patient declines taking   PSHx: Lasik eye surgery, cervical polypectomy   Med: PNV, ASA, iron  All: NKDA  Psych: denies hx of depression or anxiety  SH: denies hx of smoking, drinking, or drug usage during the pregnancy    Vital Signs Last 24 Hrs  T(C): 36.6 (09 Aug 2023 01:31), Max: 36.6 (09 Aug 2023 01:14)  T(F): 97.9 (09 Aug 2023 01:31), Max: 97.9 (09 Aug 2023 01:31)  HR: 82 (09 Aug 2023 01:31) (82 - 82)  BP: 112/67 (09 Aug 2023 01:31) (112/67 - 112/67)  BP(mean): --  RR: 16 (09 Aug 2023 01:) (16 - 16)  SpO2: --    Parameters below as of 09 Aug 2023 01:31  Patient On (Oxygen Delivery Method): room air        Physical Exam:  Gen: NAD, AxOx3  CV: RRR  Resp: CTAB  Abd: soft, NT, gravid      SVE: 0/0/-3  FHT: Category 1  [employer]: Irregular  EFW: 3745g  Sono: Cephalic     A/P: Pt is a 37yo  @40.1wks, JEREMY: 23, who presents as a scheduled risk reducing induction of labor.  - PNC significant for: +SSA, GBS positive, AMA, anemia with pregnancy     Maternal Hx SSA+ antibodies. Fetal echo done 23: Fetal heart rhythm during exam: normal fetal rhythm and rate of 138 bpm. After birth, an electrocardiogram (EKG) should be performed on the baby and interpreted by pediatric cardiology prior to hospital discharge.  After birth, an electrocardiogram (EKG) should be performed on the baby and interpreted by pediatric cardiology prior to hospital discharge. If EKG is normal, no further follow-up for the baby is needed.         1. Admit to LND. Routine Labs. IVF  2. IOL with buccal cytotec and cooks cervical balloon  3. Fetus: Cat 1 tracing, Vertex, EFW 3745g . C/w EFM.  4. GBS positive, ampicillin for prophylaxis  5. Pain: IV pain meds/epidural PRN      CLEMENTINE Schaeffer  Discussed with Dr. Love       HPI: Pt is a 39yo  @40.1wks, JEREMY: 23,  presenting as a scheduled risk reducing induction of labor. Maternal hx significant for +SSA antibodies. Patient follows up with Dr. Mccord rheumatologist. Patient states she was started on hydroxychloroquine 400mg/day but declines taking it because it makes her feel "sick". Patient states serial fetal echos every 2 weeks that were WNL throughout pregnancy. Patient denies any complaints at this time. Denies LOF/VB/CTX. Endorses positive FM.  -AMA  -GBS positive  -EFW: 3745g      Per RN Jeanie Sharpe note:  PNC complicated by Maternal Hx SSA+ antibodies. Fetal echo done 23: Fetal heart rhythm during exam: normal fetal rhythm and rate of 138 bpm. After birth, an electrocardiogram (EKG) should be performed on the baby and interpreted by pediatric cardiology prior to hospital discharge.  After birth, an electrocardiogram (EKG) should be performed on the baby and interpreted by pediatric cardiology prior to hospital discharge. If EKG is normal, no further follow-up for the baby is needed.       OBHx:   FT 2008  9#, uncomplicated  GynHx: denies hx of fibroids, cysts, abnormal Pap smears or STDs  Cervical polypectomy   PMHx: RA dx - prescribed hydroxychloroquine 400mg/day which patient declines taking   PSHx: Lasik eye surgery, cervical polypectomy   Med: PNV, ASA, iron  All: NKDA  Psych: denies hx of depression or anxiety  SH: denies hx of smoking, drinking, or drug usage during the pregnancy           HPI: Pt is a 39yo  @40.1wks, JEREMY: 23,  presenting as a scheduled risk reducing induction of labor. Maternal hx significant for +SSA antibodies. Patient follows up with Dr. Mccord rheumatologist. Patient states she was started on hydroxychloroquine 400mg/day but declines taking it because it makes her feel "sick". Patient states serial fetal echos every 2 weeks that were WNL throughout pregnancy. Rh negative, received Rhogam in May. Patient denies any complaints at this time. Denies LOF/VB/CTX. Endorses positive FM.  -AMA  -GBS positive  -EFW: 3745g      Per RN Jeanie Sharpe note:  PNC complicated by Maternal Hx SSA+ antibodies. Fetal echo done 23: Fetal heart rhythm during exam: normal fetal rhythm and rate of 138 bpm. After birth, an electrocardiogram (EKG) should be performed on the baby and interpreted by pediatric cardiology prior to hospital discharge.  After birth, an electrocardiogram (EKG) should be performed on the baby and interpreted by pediatric cardiology prior to hospital discharge. If EKG is normal, no further follow-up for the baby is needed.       OBHx:   FT 2008  9#, uncomplicated  GynHx: denies hx of fibroids, cysts, abnormal Pap smears or STDs  Cervical polypectomy   PMHx: RA dx - prescribed hydroxychloroquine 400mg/day which patient declines taking   PSHx: Lasik eye surgery, cervical polypectomy   Med: PNV, ASA, iron  All: NKDA  Psych: denies hx of depression or anxiety  SH: denies hx of smoking, drinking, or drug usage during the pregnancy           HPI: Pt is a 37yo  @40.1wks, JEREMY: 23,  presenting as a scheduled risk reducing induction of labor. Maternal hx significant for +SSA antibodies. Patient follows up with Dr. Mccord rheumatologist (last seen in 2023). Patient states she was started on hydroxychloroquine 400mg/day, but declines taking it because it makes her feel "sick". Patient states serial fetal echos every 2 weeks that were WNL throughout pregnancy. Rh negative, received Rhogam in May. Patient denies any complaints at this time. Denies LOF/VB/CTX. Endorses positive FM.  -AMA  -GBS positive  -EFW: 3745g      Per RN Jeanie Sharpe note:  PNC complicated by Maternal Hx SSA+ antibodies. Fetal echo done 23: Fetal heart rhythm during exam: normal fetal rhythm and rate of 138 bpm. After birth, an electrocardiogram (EKG) should be performed on the baby and interpreted by pediatric cardiology prior to hospital discharge.  After birth, an electrocardiogram (EKG) should be performed on the baby and interpreted by pediatric cardiology prior to hospital discharge. If EKG is normal, no further follow-up for the baby is needed.       OBHx:   FT 2008  9#, uncomplicated  GynHx: denies hx of fibroids, cysts, abnormal Pap smears or STDs  Cervical polypectomy   PMHx: RA dx - prescribed hydroxychloroquine 400mg/day which patient declines taking   PSHx: Lasik eye surgery, cervical polypectomy   Med: PNV, ASA, iron  All: NKDA  Psych: denies hx of depression or anxiety  SH: denies hx of smoking, drinking, or drug usage during the pregnancy

## 2023-08-09 NOTE — DISCHARGE NOTE OB - CARE PROVIDER_API CALL
Stuart Alarcon  Obstetrics and Gynecology  [address] [place] [address]  Phone: [phone]  Fax: [phone]  Follow Up Time: Routine

## 2023-08-09 NOTE — OB RN DELIVERY SUMMARY - BABYS CARE PROVIDER NAME, OB PROFILE
POST-RADIATION INSTRUCTIONS:   - CALL [phone] FOR A FOLLOW-UP WITH DR. WINN THREE MONTHS AFTER SEED IMPLANT  - SIDE EFFECTS OF RADIATION WILL GRADUALLY SUBSIDE, IT MAY TAKE UP TO 2 WEEKS POST-RADIATION FOR YOU TO NOTICE CHANGES; SUCH AS A DECREASE I
Christa

## 2023-08-09 NOTE — DISCHARGE NOTE OB - MEDICATION SUMMARY - MEDICATIONS TO TAKE
I will START or STAY ON the medications listed below when I get home from the hospital:    ibuprofen 600 mg oral tablet  -- 1 tab(s) by mouth every 6 hours as needed for  moderate pain  -- Indication: For pain    acetaminophen 325 mg oral tablet  -- 3 tab(s) by mouth every 6 hours as needed for  moderate pain  -- Indication: For pain    benzocaine 20% topical spray  -- 1 Apply on skin to affected area every 6 hours As needed for Perineal discomfort  -- Indication: For  (normal spontaneous vaginal delivery)    dibucaine 1% topical ointment  -- 1 Apply on skin to affected area every 6 hours As needed Perineal discomfort  -- Indication: For  (normal spontaneous vaginal delivery)    pramoxine 1% topical cream  -- 1 Apply on skin to affected area every 4 hours As needed Moderate Pain (4-6)  -- Indication: For  (normal spontaneous vaginal delivery)    hydrocortisone 1% topical cream  -- 1 Apply on skin to affected area every 6 hours As needed Moderate Pain (4-6)  -- Indication: For  (normal spontaneous vaginal delivery)    lanolin topical ointment  -- 1 Apply on skin to affected area every 6 hours As needed nipple soreness  -- Indication: For Nipple soreness    witch hazel 50% topical pad  -- 1 Apply on skin to affected area every 4 hours As needed Perineal discomfort  -- Indication: For  (normal spontaneous vaginal delivery)    ferrous sulfate 325 mg (65 mg elemental iron) oral tablet  -- 1 by mouth once a day  -- Indication: For anemia

## 2023-08-09 NOTE — OB RN PATIENT PROFILE - AS TEMP SITE
----- Message from Sarah Nowak sent at 7/29/2021 11:00 AM EDT -----  Regarding: Pancrelipase, Lip-Prot-Amyl, (CREON) 09573 units capsule  Contact: [phone]  Pt is needing a prescription called in. She only wants a 30 day supply.     [facility] - [place] [address] - [phone]  - [phone]  Phone:  [phone]  Fax:  [phone]     oral

## 2023-08-09 NOTE — OB RN PATIENT PROFILE - NSMATERNALFETALCONCERNS_OBGYN_ALL_OB_FT
Fetal Alert  5.2.23: Maternal Hx SSA+ antibodies.Fetal echo done 5/1/23:Fetal heart rhythm during exam: normal fetal rhythm and rate of 138 bpm.After birth, an electrocardiogram (EKG) should be performed on the baby and interpreted by pediatric cardiology prior to hospital discharge.Jeanie Sharpe RN  6.19.23: ADDENDUM:After birth, an electrocardiogram (EKG) should be performed on the baby and interpreted by pediatric cardiology prior to hospital discharge. If EKG is normal, no further follow-up for the baby is needed. Jeanie Sharpe RN

## 2023-08-09 NOTE — DISCHARGE NOTE OB - PATIENT PORTAL LINK FT
You can access the FollowMyHealth Patient Portal offered by [facility] by registering at the following website: [email or web address]. By joining [facility]’s FollowMyHealth portal, you will also be able to view your health information using other applications (apps) compatible with our system.

## 2023-08-10 LAB — KLEIHAUER-BETKE CALCULATION: 0 % — SIGNIFICANT CHANGE UP

## 2023-08-10 RX ORDER — IBUPROFEN 200 MG
600 TABLET ORAL EVERY 6 HOURS
Refills: 0 | Status: DISCONTINUED | OUTPATIENT
Start: 2023-08-10 | End: 2023-08-11

## 2023-08-10 RX ADMIN — Medication 600 MILLIGRAM(S): at 12:27

## 2023-08-10 RX ADMIN — Medication 975 MILLIGRAM(S): at 10:12

## 2023-08-10 RX ADMIN — Medication 975 MILLIGRAM(S): at 09:42

## 2023-08-10 RX ADMIN — Medication 600 MILLIGRAM(S): at 06:20

## 2023-08-10 RX ADMIN — Medication 600 MILLIGRAM(S): at 05:28

## 2023-08-10 RX ADMIN — Medication 975 MILLIGRAM(S): at 03:00

## 2023-08-10 RX ADMIN — Medication 1 TABLET(S): at 12:27

## 2023-08-10 RX ADMIN — Medication 975 MILLIGRAM(S): at 23:20

## 2023-08-10 RX ADMIN — Medication 600 MILLIGRAM(S): at 00:19

## 2023-08-10 RX ADMIN — Medication 975 MILLIGRAM(S): at 02:05

## 2023-08-10 RX ADMIN — Medication 600 MILLIGRAM(S): at 17:20

## 2023-08-10 RX ADMIN — Medication 600 MILLIGRAM(S): at 13:00

## 2023-08-10 RX ADMIN — SODIUM CHLORIDE 3 MILLILITER(S): 9 INJECTION INTRAMUSCULAR; INTRAVENOUS; SUBCUTANEOUS at 22:00

## 2023-08-10 RX ADMIN — Medication 600 MILLIGRAM(S): at 01:00

## 2023-08-10 RX ADMIN — Medication 975 MILLIGRAM(S): at 22:20

## 2023-08-10 RX ADMIN — Medication 600 MILLIGRAM(S): at 17:50

## 2023-08-10 NOTE — OB RN DELIVERY SUMMARY - BABY A: APGAR 5 MIN RESP RATE, DELIVERY
How Severe Are Your Spot(S)?: mild Have Your Spot(S) Been Treated In The Past?: has not been treated Hpi Title: Evaluation of a Skin Lesion (2) good, crying

## 2023-08-11 VITALS
SYSTOLIC BLOOD PRESSURE: 131 MMHG | HEART RATE: 75 BPM | RESPIRATION RATE: 17 BRPM | OXYGEN SATURATION: 100 % | TEMPERATURE: 98 F | DIASTOLIC BLOOD PRESSURE: 80 MMHG

## 2023-08-11 RX ORDER — HYDROCORTISONE 1 %
1 OINTMENT (GRAM) TOPICAL
Qty: 0 | Refills: 0 | DISCHARGE
Start: 2023-08-11

## 2023-08-11 RX ORDER — LANOLIN
1 OINTMENT (GRAM) TOPICAL
Qty: 0 | Refills: 0 | DISCHARGE
Start: 2023-08-11

## 2023-08-11 RX ORDER — DIBUCAINE 1 %
1 OINTMENT (GRAM) RECTAL
Qty: 0 | Refills: 0 | DISCHARGE
Start: 2023-08-11

## 2023-08-11 RX ORDER — AER TRAVELER 0.5 G/1
1 SOLUTION RECTAL; TOPICAL
Qty: 0 | Refills: 0 | DISCHARGE
Start: 2023-08-11

## 2023-08-11 RX ORDER — PRAMOXINE HYDROCHLORIDE 150 MG/15G
1 AEROSOL, FOAM RECTAL
Qty: 0 | Refills: 0 | DISCHARGE
Start: 2023-08-11

## 2023-08-11 RX ORDER — ACETAMINOPHEN 500 MG
3 TABLET ORAL
Qty: 0 | Refills: 0 | DISCHARGE
Start: 2023-08-11

## 2023-08-11 RX ORDER — IBUPROFEN 200 MG
1 TABLET ORAL
Qty: 0 | Refills: 0 | DISCHARGE
Start: 2023-08-11

## 2023-08-11 RX ORDER — BENZOCAINE 10 %
1 GEL (GRAM) MUCOUS MEMBRANE
Qty: 0 | Refills: 0 | DISCHARGE
Start: 2023-08-11

## 2023-08-11 RX ORDER — ASPIRIN/CALCIUM CARB/MAGNESIUM 324 MG
1 TABLET ORAL
Refills: 0 | DISCHARGE

## 2023-08-11 RX ADMIN — FAMOTIDINE 20 MILLIGRAM(S): 10 INJECTION INTRAVENOUS at 02:40

## 2023-08-11 RX ADMIN — Medication 600 MILLIGRAM(S): at 00:32

## 2023-08-11 RX ADMIN — Medication 600 MILLIGRAM(S): at 01:30

## 2023-08-11 RX ADMIN — SODIUM CHLORIDE 3 MILLILITER(S): 9 INJECTION INTRAMUSCULAR; INTRAVENOUS; SUBCUTANEOUS at 06:40

## 2023-08-11 RX ADMIN — Medication 975 MILLIGRAM(S): at 10:29

## 2023-08-11 RX ADMIN — Medication 975 MILLIGRAM(S): at 09:29

## 2023-08-11 NOTE — PROGRESS NOTE ADULT - ASSESSMENT
Assessment and Plan  PPD #1 s/p     Doing well.  Increase ambulation.  Encourage breastfeeding.  Continue taking pain medications PRN  PP & PPD Instructions reviewed.  PP educational materials reviewed & provided     D/C home tomorrow.    Discussed with MD Dorian Butler      
Assessment and Plan  PPD #2 s/p   Doing well, bonding with baby, support at bedside  RH negative- s/p Rhogam  RA with +SSA antibodies- continue Hydroxychloroquine as prescribed  Encourage ambulation.  PP & PPD Instructions reviewed.  CPC.  D/C home today  RTO 6 weeks for routine postpartum visit/PRN

## 2023-08-11 NOTE — PROGRESS NOTE ADULT - SUBJECTIVE AND OBJECTIVE BOX
Post-partum Note,   She is a  38y woman who is now post-partum day: 1    Subjective:  The patient feels well.  She is ambulating.   She is tolerating regular diet.  She denies nausea and vomiting; denies fever.  She is voiding.  Her pain is controlled.  She reports normal postpartum bleeding.  She is breastfeeding.  She is formula feeding.  She is bonding with infant.    Physical exam:    Vital Signs Last 24 Hrs  T(C): 36.8 (10 Aug 2023 06:24), Max: 37.1 (10 Aug 2023 01:59)  T(F): 98.2 (10 Aug 2023 06:24), Max: 98.8 (10 Aug 2023 01:59)  HR: 75 (10 Aug 2023 06:24) (55 - 135)  BP: 118/80 (10 Aug 2023 06:24) (90/50 - 144/63)  BP(mean): --  RR: 18 (10 Aug 2023 06:24) (16 - 18)  SpO2: 100% (10 Aug 2023 06:24) (59% - 100%)    Parameters below as of 10 Aug 2023 06:24  Patient On (Oxygen Delivery Method): room air        Gen: NAD  Breast: Soft, nontender, not engorged.  Abdomen: Soft, nontender, no distension , firm uterine fundus at umbilicus.  Pelvic: Normal lochia noted  Ext: No calf tenderness    LABS:                        10.4   8.61  )-----------( 195      ( 09 Aug 2023 01:20 )             32.9       Rubella status:     Allergies    No Known Allergies    Intolerances      MEDICATIONS  (STANDING):  acetaminophen     Tablet .. 975 milliGRAM(s) Oral <User Schedule>  diphtheria/tetanus/pertussis (acellular) Vaccine (Adacel) 0.5 milliLiter(s) IntraMuscular once  ibuprofen  Tablet. 600 milliGRAM(s) Oral every 6 hours  oxytocin Infusion 41.667 milliUNIT(s)/Min (125 mL/Hr) IV Continuous <Continuous>  prenatal multivitamin 1 Tablet(s) Oral daily  sodium chloride 0.9% lock flush 3 milliLiter(s) IV Push every 8 hours  terbutaline  Injectable 0.25 milliGRAM(s) SubCutaneous once    MEDICATIONS  (PRN):  benzocaine 20%/menthol 0.5% Spray 1 Spray(s) Topical every 6 hours PRN for Perineal discomfort  dibucaine 1% Ointment 1 Application(s) Topical every 6 hours PRN Perineal discomfort  diphenhydrAMINE 25 milliGRAM(s) Oral every 6 hours PRN Pruritus  famotidine    Tablet 20 milliGRAM(s) Oral daily PRN Heartburn  hydrocortisone 1% Cream 1 Application(s) Topical every 6 hours PRN Moderate Pain (4-6)  lanolin Ointment 1 Application(s) Topical every 6 hours PRN nipple soreness  magnesium hydroxide Suspension 30 milliLiter(s) Oral two times a day PRN Constipation  oxyCODONE    IR 5 milliGRAM(s) Oral once PRN Moderate to Severe Pain (4-10)  oxyCODONE    IR 5 milliGRAM(s) Oral every 3 hours PRN Moderate to Severe Pain (4-10)  pramoxine 1%/zinc 5% Cream 1 Application(s) Topical every 4 hours PRN Moderate Pain (4-6)  simethicone 80 milliGRAM(s) Chew every 4 hours PRN Gas  witch hazel Pads 1 Application(s) Topical every 4 hours PRN Perineal discomfort      
Post-partum Note,   She is a  38y woman who is now post-partum day: 2    Subjective:  The patient feels well.  She is ambulating.   She is tolerating regular diet.  She denies nausea and vomiting; denies fever.  She is voiding.  Her pain is controlled.  She reports normal postpartum bleeding.  She is breastfeeding.    Physical exam:    Vital Signs Last 24 Hrs  T(C): 36.8 (11 Aug 2023 06:18), Max: 36.8 (11 Aug 2023 06:18)  T(F): 98.3 (11 Aug 2023 06:18), Max: 98.3 (11 Aug 2023 06:18)  HR: 60 (11 Aug 2023 06:18) (60 - 81)  BP: 100/66 (11 Aug 2023 06:18) (100/66 - 107/63)  BP(mean): --  RR: 18 (11 Aug 2023 06:18) (18 - 18)  SpO2: 98% (11 Aug 2023 06:18) (98% - 100%)    Parameters below as of 11 Aug 2023 06:18  Patient On (Oxygen Delivery Method): room air        Gen: NAD  Breast: Soft, nontender, not engorged.  Abdomen: Soft, nontender, no distension , firm uterine fundus at umbilicus.  Pelvic: Normal lochia noted  Ext: No calf tenderness        Allergies    No Known Allergies        MEDICATIONS  (STANDING):  acetaminophen     Tablet .. 975 milliGRAM(s) Oral <User Schedule>  diphtheria/tetanus/pertussis (acellular) Vaccine (Adacel) 0.5 milliLiter(s) IntraMuscular once  ibuprofen  Tablet. 600 milliGRAM(s) Oral every 6 hours  prenatal multivitamin 1 Tablet(s) Oral daily  sodium chloride 0.9% lock flush 3 milliLiter(s) IV Push every 8 hours    MEDICATIONS  (PRN):  benzocaine 20%/menthol 0.5% Spray 1 Spray(s) Topical every 6 hours PRN for Perineal discomfort  dibucaine 1% Ointment 1 Application(s) Topical every 6 hours PRN Perineal discomfort  diphenhydrAMINE 25 milliGRAM(s) Oral every 6 hours PRN Pruritus  famotidine    Tablet 20 milliGRAM(s) Oral daily PRN Heartburn  hydrocortisone 1% Cream 1 Application(s) Topical every 6 hours PRN Moderate Pain (4-6)  lanolin Ointment 1 Application(s) Topical every 6 hours PRN nipple soreness  magnesium hydroxide Suspension 30 milliLiter(s) Oral two times a day PRN Constipation  oxyCODONE    IR 5 milliGRAM(s) Oral once PRN Moderate to Severe Pain (4-10)  oxyCODONE    IR 5 milliGRAM(s) Oral every 3 hours PRN Moderate to Severe Pain (4-10)  pramoxine 1%/zinc 5% Cream 1 Application(s) Topical every 4 hours PRN Moderate Pain (4-6)  simethicone 80 milliGRAM(s) Chew every 4 hours PRN Gas  witch hazel Pads 1 Application(s) Topical every 4 hours PRN Perineal discomfort

## 2023-09-05 ENCOUNTER — APPOINTMENT (OUTPATIENT)
Dept: RHEUMATOLOGY | Facility: CLINIC | Age: 38
End: 2023-09-05
Payer: COMMERCIAL

## 2023-09-05 VITALS
WEIGHT: 175 LBS | BODY MASS INDEX: 25.92 KG/M2 | HEART RATE: 89 BPM | HEIGHT: 69 IN | DIASTOLIC BLOOD PRESSURE: 75 MMHG | TEMPERATURE: 97.1 F | RESPIRATION RATE: 16 BRPM | SYSTOLIC BLOOD PRESSURE: 152 MMHG | OXYGEN SATURATION: 98 %

## 2023-09-05 DIAGNOSIS — R76.8 OTHER SPECIFIED ABNORMAL IMMUNOLOGICAL FINDINGS IN SERUM: ICD-10-CM

## 2023-09-05 DIAGNOSIS — M06.9 RHEUMATOID ARTHRITIS, UNSPECIFIED: ICD-10-CM

## 2023-09-05 LAB
BASOPHILS # BLD AUTO: 0.02 K/UL
BASOPHILS NFR BLD AUTO: 0.3 %
EOSINOPHIL # BLD AUTO: 0.12 K/UL
EOSINOPHIL NFR BLD AUTO: 2 %
HCT VFR BLD CALC: 39.6 %
HGB BLD-MCNC: 12.4 G/DL
IMM GRANULOCYTES NFR BLD AUTO: 0.3 %
LYMPHOCYTES # BLD AUTO: 1.7 K/UL
LYMPHOCYTES NFR BLD AUTO: 28 %
MAN DIFF?: NORMAL
MCHC RBC-ENTMCNC: 25.3 PG
MCHC RBC-ENTMCNC: 31.3 GM/DL
MCV RBC AUTO: 80.8 FL
MONOCYTES # BLD AUTO: 0.44 K/UL
MONOCYTES NFR BLD AUTO: 7.2 %
NEUTROPHILS # BLD AUTO: 3.78 K/UL
NEUTROPHILS NFR BLD AUTO: 62.2 %
PLATELET # BLD AUTO: 262 K/UL
RBC # BLD: 4.9 M/UL
RBC # FLD: 17.9 %
WBC # FLD AUTO: 6.08 K/UL

## 2023-09-05 PROCEDURE — 99214 OFFICE O/P EST MOD 30 MIN: CPT

## 2023-09-05 RX ORDER — IBUPROFEN 600 MG/1
600 TABLET, FILM COATED ORAL
Qty: 270 | Refills: 3 | Status: ACTIVE | COMMUNITY
Start: 2023-09-05 | End: 1900-01-01

## 2023-09-05 NOTE — PHYSICAL EXAM
[General Appearance - Alert] : alert [General Appearance - Well Nourished] : well nourished [General Appearance - In No Acute Distress] : in no acute distress [General Appearance - Well Developed] : well developed [Sclera] : the sclera and conjunctiva were normal [PERRL With Normal Accommodation] : pupils were equal in size, round, and reactive to light [Neck Appearance] : the appearance of the neck was normal [Auscultation Breath Sounds / Voice Sounds] : lungs were clear to auscultation bilaterally [Respiration, Rhythm And Depth] : normal respiratory rhythm and effort [Heart Rate And Rhythm] : heart rate was normal and rhythm regular [Heart Sounds] : normal S1 and S2 [Edema] : there was no peripheral edema [Abdomen Tenderness] : non-tender [Cervical Lymph Nodes Enlarged Anterior Bilaterally] : anterior cervical [Cervical Lymph Nodes Enlarged Posterior Bilaterally] : posterior cervical [Supraclavicular Lymph Nodes Enlarged Bilaterally] : supraclavicular [Nail Clubbing] : no clubbing  or cyanosis of the fingernails [Abnormal Walk] : normal gait [Musculoskeletal - Swelling] : no joint swelling seen [] : no rash [Skin Lesions] : no skin lesions [Sensation] : the sensory exam was normal to light touch and pinprick [Motor Exam] : the motor exam was normal [No Focal Deficits] : no focal deficits

## 2023-09-05 NOTE — ASSESSMENT
[FreeTextEntry1] : 1. RA: Missing information but according to patient, she was diagnosed with RA in 2020 with symptoms of swelling and stiffness of her legs and hand joints with positive blood tests. Followed with Dr. Alexus Martines (R[facility]umatology [facility]) who recommended treatment at that time, but she did not remember the name of the medication and was only taking it for a brief period of time. Symptoms typically worsened in the winter. A CCP Ab from 2022 was negative. She has been off medications since 2022. At the time of her initial visit while pregnant, she had no joint symptoms. However, after she delivered on [id number], she developed rather diffuse pain that did not coincide with joints or muscles. She found some relief with ibuprofen 600 mg/d.  2. Positive SSA: Anti-SSA ab positive with titer ~1000, checked in the context of RA diagnosis and 2023 pregnancy. Reported sicca symptoms but no other complaints. She followed with [facility] and has had fetal echo's, which were normal. She is currently scheduled to repeat fetal echo's every 2 weeks. MADI, complements were previously negative at an outside lab. Patient also had low titer B2GPI and aCL <40 without previous obstetric complications, DVT/PE history. 3. OB history:    a. Pregnancy No. 1: 2008, which was a normal, full term pregnancy.    b. Pregnancy No. 2: 2023: no complications. She was started on ASA 81 mg 2xd for aPL low titer blood tests. Fetal echo's every 2 weeks due to SSA+ blood test. She started hydroxychloroquine at 22 weeks gestation. She delivered on [id number] with apparently no complications to mother or baby.   Plan Lab tests   Return 1 month Ibuprofen 600 mg 3xd

## 2023-09-05 NOTE — HISTORY OF PRESENT ILLNESS
[FreeTextEntry1] : 1. RA: Missing information but according to patient, she was diagnosed with RA in 2020 with symptoms of swelling and stiffness of her legs and hand joints with positive blood tests. Followed with Dr. Alexus Martines ([facility]) who recommended treatment at that time, but she did not remember the name of the medication and was only taking it for a brief period of time. Symptoms typically worsened in the winter. A CCP Ab from 2022 was negative. She has been off medications since 2022. At the time of her initial visit while pregnant, she had no joint symptoms. However, after she delivered on [id number], she developed rather diffuse pain that did not coincide with joints or muscles. She found some relief with ibuprofen 600 mg/d.  2. Positive SSA: Anti-SSA ab positive with titer ~1000, checked in the context of RA diagnosis and 2023 pregnancy. Reported sicca symptoms but no other complaints. She followed with [facility] and has had fetal echo's, which were normal. She is currently scheduled to repeat fetal echo's every 2 weeks. MADI, complements were previously negative at an outside lab. Patient also had low titer B2GPI and aCL <40 without previous obstetric complications, DVT/PE history. 3. OB history:    a. Pregnancy No. 1: 2008, which was a normal, full term pregnancy.    b. Pregnancy No. 2: 2023: no complications. She was started on ASA 81 mg 2xd for aPL low titer blood tests. Fetal echo's every 2 weeks due to SSA+ blood test. She started hydroxychloroquine at 22 weeks gestation. She delivered on [id number] with apparently no complications to mother or baby.   Meds ASA 81 mg/d stopped vitamins hydroxychloroquine 200 mg 2xd   Fe stopped Vit D stopped

## 2023-09-06 LAB
ALBUMIN SERPL ELPH-MCNC: 4 G/DL
ALP BLD-CCNC: 91 U/L
ALT SERPL-CCNC: 20 U/L
ANION GAP SERPL CALC-SCNC: 12 MMOL/L
APPEARANCE: ABNORMAL
AST SERPL-CCNC: 16 U/L
BACTERIA: ABNORMAL /HPF
BILIRUB SERPL-MCNC: 0.2 MG/DL
BILIRUBIN URINE: NEGATIVE
BLOOD URINE: NEGATIVE
BUN SERPL-MCNC: 7 MG/DL
C3 SERPL-MCNC: 126 MG/DL
C4 SERPL-MCNC: 36 MG/DL
CALCIUM SERPL-MCNC: 8.8 MG/DL
CAST: 1 /LPF
CCP AB SER IA-ACNC: <8 UNITS
CHLORIDE SERPL-SCNC: 105 MMOL/L
CK SERPL-CCNC: 72 U/L
CO2 SERPL-SCNC: 23 MMOL/L
COLOR: YELLOW
CREAT SERPL-MCNC: 0.57 MG/DL
CREAT SPEC-SCNC: 226 MG/DL
CREAT/PROT UR: 0.1 RATIO
CRP SERPL-MCNC: <3 MG/L
DSDNA AB SER-ACNC: 15 IU/ML
EGFR: 118 ML/MIN/1.73M2
ENA RNP AB SER IA-ACNC: 0.4 AL
ENA SM AB SER IA-ACNC: <0.2 AL
ENA SS-A AB SER IA-ACNC: >8 AL
ENA SS-B AB SER IA-ACNC: <0.2 AL
EPITHELIAL CELLS: >36 /HPF
ERYTHROCYTE [SEDIMENTATION RATE] IN BLOOD BY WESTERGREN METHOD: 45 MM/HR
GLUCOSE QUALITATIVE U: NEGATIVE MG/DL
KETONES URINE: NEGATIVE MG/DL
LEUKOCYTE ESTERASE URINE: ABNORMAL
MICROSCOPIC-UA: NORMAL
NITRITE URINE: NEGATIVE
PH URINE: 6
POTASSIUM SERPL-SCNC: 3.5 MMOL/L
PROT SERPL-MCNC: 7 G/DL
PROT UR-MCNC: 19 MG/DL
PROTEIN URINE: NORMAL MG/DL
RED BLOOD CELLS URINE: 1 /HPF
RF+CCP IGG SER-IMP: NEGATIVE
RHEUMATOID FACT SER QL: 12 IU/ML
SODIUM SERPL-SCNC: 140 MMOL/L
SPECIFIC GRAVITY URINE: 1.02
TSH SERPL-ACNC: 0.97 UIU/ML
UROBILINOGEN URINE: 1 MG/DL
WHITE BLOOD CELLS URINE: 10 /HPF

## 2023-09-08 LAB — HYDROXYCHLOROQUINE CONCENTRATION: 36 NG/ML

## 2023-10-02 ENCOUNTER — APPOINTMENT (OUTPATIENT)
Dept: RHEUMATOLOGY | Facility: CLINIC | Age: 38
End: 2023-10-02

## 2024-04-22 NOTE — OB PROVIDER H&P - PRO PRENATAL LABS ORI SOURCE RUBELLA
Patient reports for two weeks with abdominal pains and flu symptoms. Patient endorses generalized abdominal pain. Patient reports wanting a full physical to make sure she is well. Patient reports wanting to also be checked for the new COVID variant. Patient states she had green emesis earlier. Patient reports having a Primary Care Provider.          hard copy, drawn during this pregnancy

## 2024-08-22 NOTE — OB RN PATIENT PROFILE - [id number]_ALL_OB
Alert and orientated x4.  Ambulating in hallways.  Denies pain.  Using oxygen 2liters while sleeping.  Saturations 92-93%.      0700-patient had a soft large black bowel movement.  She said her abdomen feels much better   unknown

## 2025-03-26 NOTE — OB NEONATOLOGY/PEDIATRICIAN DELIVERY SUMMARY - BABY A: APGAR 5 MIN RESP RATE, DELIVERY
Well marlo.   
(2) good, crying